# Patient Record
Sex: FEMALE | Race: WHITE | NOT HISPANIC OR LATINO | Employment: FULL TIME | RURAL
[De-identification: names, ages, dates, MRNs, and addresses within clinical notes are randomized per-mention and may not be internally consistent; named-entity substitution may affect disease eponyms.]

---

## 2020-06-25 ENCOUNTER — HISTORICAL (OUTPATIENT)
Dept: ADMINISTRATIVE | Facility: HOSPITAL | Age: 45
End: 2020-06-25

## 2020-06-25 LAB — HCG SERUM QUALITATIVE: NEGATIVE

## 2021-07-22 ENCOUNTER — HOSPITAL ENCOUNTER (OUTPATIENT)
Dept: RADIOLOGY | Facility: HOSPITAL | Age: 46
Discharge: HOME | End: 2021-07-22
Attending: ORTHOPAEDIC SURGERY
Payer: OTHER GOVERNMENT

## 2021-07-22 DIAGNOSIS — M25.562 ACUTE PAIN OF LEFT KNEE: ICD-10-CM

## 2021-07-22 PROBLEM — G89.29 CHRONIC PAIN OF LEFT KNEE: Status: ACTIVE | Noted: 2021-07-22

## 2021-07-22 PROCEDURE — 73562 X-RAY EXAM OF KNEE 3: CPT | Mod: TC,LT

## 2021-10-21 ENCOUNTER — HOSPITAL ENCOUNTER (OUTPATIENT)
Dept: RADIOLOGY | Facility: HOSPITAL | Age: 46
Discharge: HOME | End: 2021-10-21
Attending: ORTHOPAEDIC SURGERY
Payer: OTHER GOVERNMENT

## 2021-10-21 DIAGNOSIS — M25.561 ACUTE PAIN OF RIGHT KNEE: ICD-10-CM

## 2021-10-21 PROCEDURE — 73562 X-RAY EXAM OF KNEE 3: CPT | Mod: TC,LT

## 2023-01-05 ENCOUNTER — OFFICE VISIT (OUTPATIENT)
Dept: ORTHOPEDICS | Facility: CLINIC | Age: 48
End: 2023-01-05
Payer: OTHER GOVERNMENT

## 2023-01-05 ENCOUNTER — CLINICAL SUPPORT (OUTPATIENT)
Dept: CARDIOLOGY | Facility: CLINIC | Age: 48
End: 2023-01-05
Payer: OTHER GOVERNMENT

## 2023-01-05 DIAGNOSIS — Z01.810 PRE-OPERATIVE CARDIOVASCULAR EXAMINATION: ICD-10-CM

## 2023-01-05 DIAGNOSIS — M25.511 ACUTE PAIN OF RIGHT SHOULDER: Primary | ICD-10-CM

## 2023-01-05 DIAGNOSIS — Z01.812 PRE-OPERATIVE LABORATORY EXAMINATION: ICD-10-CM

## 2023-01-05 DIAGNOSIS — M25.531 RIGHT WRIST PAIN: ICD-10-CM

## 2023-01-05 PROCEDURE — 93010 EKG 12-LEAD: ICD-10-PCS | Mod: S$PBB,,, | Performed by: STUDENT IN AN ORGANIZED HEALTH CARE EDUCATION/TRAINING PROGRAM

## 2023-01-05 PROCEDURE — 99214 OFFICE O/P EST MOD 30 MIN: CPT | Mod: PBBFAC | Performed by: ORTHOPAEDIC SURGERY

## 2023-01-05 PROCEDURE — 99212 OFFICE O/P EST SF 10 MIN: CPT | Mod: PBBFAC

## 2023-01-05 PROCEDURE — 99203 OFFICE O/P NEW LOW 30 MIN: CPT | Mod: S$PBB,,, | Performed by: ORTHOPAEDIC SURGERY

## 2023-01-05 PROCEDURE — 93005 ELECTROCARDIOGRAM TRACING: CPT | Mod: PBBFAC | Performed by: STUDENT IN AN ORGANIZED HEALTH CARE EDUCATION/TRAINING PROGRAM

## 2023-01-05 PROCEDURE — 93010 ELECTROCARDIOGRAM REPORT: CPT | Mod: S$PBB,,, | Performed by: STUDENT IN AN ORGANIZED HEALTH CARE EDUCATION/TRAINING PROGRAM

## 2023-01-05 PROCEDURE — 99203 PR OFFICE/OUTPT VISIT, NEW, LEVL III, 30-44 MIN: ICD-10-PCS | Mod: S$PBB,,, | Performed by: ORTHOPAEDIC SURGERY

## 2023-01-05 NOTE — H&P (VIEW-ONLY)
CC:   Chief Complaint   Patient presents with    Right Shoulder - Pain        PREVIOUS INFO:   Previous Info:  07/22/2021HISTORY: Rosa Maria Keith  is a 45 y.o. status post  Right knee ACL 2013  Left knee ACL reconstruction 06/20/2012.  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     Patient is having left knee pain when she tries to do PT with the  she is requesting help with that        HISTORY:   1/5/2023    Rosa Maria Jaramillo  is a 47 y.o. she fell backwards look through both her hands out caught her self she had left and right shoulder pain also right wrist pain the shoulder has gotten better but the wrist is giving her troubles she reportedly had x-rays at the [place] [facility] this week which were negative at the wrist but it is bothering her she is had MRI of her right shoulder please see below it does show a rotator cuff tear she is been treated with a formal course of physical therapy is only making it worse this has been going on for a year now and she is ready to have something done      PAST MEDICAL HISTORY: History reviewed. No pertinent past medical history.       PAST SURGICAL HISTORY:   Past Surgical History:   Procedure Laterality Date    APPENDECTOMY  1994    KNEE ARTHROSCOPY W/ ACL RECONSTRUCTION Right 2013    ILM    KNEE ARTHROSCOPY W/ ACL RECONSTRUCTION AND PATELLA GRAFT Left 2012    ILM    KNEE ARTHROSCOPY W/ ACL RECONSTRUCTION AND PATELLA GRAFT Left 2015    ILM- ALLOGRAFT           ALLERGIES:   Review of patient's allergies indicates:   Allergen Reactions    Lisinopril      Other reaction(s): Cough    Penicillins Edema        MEDICATIONS :    Current Outpatient Medications:     amLODIPine (NORVASC) 10 MG tablet, , Disp: , Rfl:     dexAMETHasone (DECADRON) 4 MG Tab, TAKE 1/2 TABLET DAILY ON DAY 7-14 OF CYCLE, Disp: , Rfl:     letrozole (FEMARA) 2.5 mg Tab, TAKE 1 TABLET 2 TIMES A DAY ON DAY 3-7  OF CYCLE, Disp: , Rfl:     losartan (COZAAR) 25 MG tablet, , Disp: , Rfl:     meloxicam (MOBIC) 7.5 MG tablet, Take 1 tablet (7.5 mg total) by mouth once daily., Disp: 30 tablet, Rfl: 2    progesterone (PROMETRIUM) 200 MG capsule, TAKE TWO CAPSULES BY MOUTH DAILY AT BEDTIME ON DAYS 14-26 OF CYCLE, Disp: , Rfl:      SOCIAL HISTORY:   Social History     Socioeconomic History    Marital status: Significant Other   Tobacco Use    Smoking status: Never        ROS    FAMILY HISTORY: History reviewed. No pertinent family history.       PHYSICAL EXAM: There were no vitals filed for this visit.            There is no height or weight on file to calculate BMI.     In general, this is a well-developed, well-nourished female . The patient is alert, oriented and cooperative.      HEENT:  Normocephalic, atraumatic.  Extraocular movements are intact bilaterally.  The oropharynx is benign.       NECK:  Nontender with good range of motion.      PULMONARY: Respirations are even and non-labored.       CARDIOVASCULAR: Pulses regular by peripheral palpation.       ABDOMEN:  Soft, non-tender, non-distended.        EXTREMITIES:  Cervical spine she moves well does not cause her shoulder pain    Right shoulder sternoclavicular joints nontender AC joint is nontender  Subacromial space is tender to palpation she really not tender over the biceps pinch min testing 1 2 abduction all cause pain she has significantly decreased Timi's testing strength on the right shoulder    Ortho Exam      RADIOGRAPHIC FINDINGS:  MRI performed at [facility] dated 09/16/2022 right shoulder small focal full-thickness tear of the rotator cuff supraspinatus region I agree with these findings      .      IMPRESSION:  Right shoulder rotator cuff tear tear failed conservative therapy including including formal physical therapy she does desire proceed long rehab course discussed that with surgery    PLAN:  Right shoulder arthroscopy with open acromioplasty rotator cuff  repair will evaluate biceps time surgery long rehab course discussed    As far as her right wrist if you loaded and supinate or pronate she has significant pain she says is very difficult for her to tourniquet door handle will MRI her right wrist with intra-articular contrast              Ac Sharp III      (Subject to voice recognition error, transcription service not allowed)

## 2023-01-05 NOTE — PROGRESS NOTES
CC:   Chief Complaint   Patient presents with    Right Shoulder - Pain        PREVIOUS INFO:   Previous Info:  07/22/2021HISTORY: Rosa Maria Keith  is a 45 y.o. status post  Right knee ACL 2013  Left knee ACL reconstruction 06/20/2012.  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     Patient is having left knee pain when she tries to do PT with the  she is requesting help with that        HISTORY:   1/5/2023    Rosa Maria Jaramillo  is a 47 y.o. she fell backwards look through both her hands out caught her self she had left and right shoulder pain also right wrist pain the shoulder has gotten better but the wrist is giving her troubles she reportedly had x-rays at the [place] [facility] this week which were negative at the wrist but it is bothering her she is had MRI of her right shoulder please see below it does show a rotator cuff tear she is been treated with a formal course of physical therapy is only making it worse this has been going on for a year now and she is ready to have something done      PAST MEDICAL HISTORY: History reviewed. No pertinent past medical history.       PAST SURGICAL HISTORY:   Past Surgical History:   Procedure Laterality Date    APPENDECTOMY  1994    KNEE ARTHROSCOPY W/ ACL RECONSTRUCTION Right 2013    ILM    KNEE ARTHROSCOPY W/ ACL RECONSTRUCTION AND PATELLA GRAFT Left 2012    ILM    KNEE ARTHROSCOPY W/ ACL RECONSTRUCTION AND PATELLA GRAFT Left 2015    ILM- ALLOGRAFT           ALLERGIES:   Review of patient's allergies indicates:   Allergen Reactions    Lisinopril      Other reaction(s): Cough    Penicillins Edema        MEDICATIONS :    Current Outpatient Medications:     amLODIPine (NORVASC) 10 MG tablet, , Disp: , Rfl:     dexAMETHasone (DECADRON) 4 MG Tab, TAKE 1/2 TABLET DAILY ON DAY 7-14 OF CYCLE, Disp: , Rfl:     letrozole (FEMARA) 2.5 mg Tab, TAKE 1 TABLET 2 TIMES A DAY ON DAY 3-7  OF CYCLE, Disp: , Rfl:     losartan (COZAAR) 25 MG tablet, , Disp: , Rfl:     meloxicam (MOBIC) 7.5 MG tablet, Take 1 tablet (7.5 mg total) by mouth once daily., Disp: 30 tablet, Rfl: 2    progesterone (PROMETRIUM) 200 MG capsule, TAKE TWO CAPSULES BY MOUTH DAILY AT BEDTIME ON DAYS 14-26 OF CYCLE, Disp: , Rfl:      SOCIAL HISTORY:   Social History     Socioeconomic History    Marital status: Significant Other   Tobacco Use    Smoking status: Never        ROS    FAMILY HISTORY: History reviewed. No pertinent family history.       PHYSICAL EXAM: There were no vitals filed for this visit.            There is no height or weight on file to calculate BMI.     In general, this is a well-developed, well-nourished female . The patient is alert, oriented and cooperative.      HEENT:  Normocephalic, atraumatic.  Extraocular movements are intact bilaterally.  The oropharynx is benign.       NECK:  Nontender with good range of motion.      PULMONARY: Respirations are even and non-labored.       CARDIOVASCULAR: Pulses regular by peripheral palpation.       ABDOMEN:  Soft, non-tender, non-distended.        EXTREMITIES:  Cervical spine she moves well does not cause her shoulder pain    Right shoulder sternoclavicular joints nontender AC joint is nontender  Subacromial space is tender to palpation she really not tender over the biceps pinch min testing 1 2 abduction all cause pain she has significantly decreased Timi's testing strength on the right shoulder    Ortho Exam      RADIOGRAPHIC FINDINGS:  MRI performed at [facility] dated 09/16/2022 right shoulder small focal full-thickness tear of the rotator cuff supraspinatus region I agree with these findings      .      IMPRESSION:  Right shoulder rotator cuff tear tear failed conservative therapy including including formal physical therapy she does desire proceed long rehab course discussed that with surgery    PLAN:  Right shoulder arthroscopy with open acromioplasty rotator cuff  repair will evaluate biceps time surgery long rehab course discussed    As far as her right wrist if you loaded and supinate or pronate she has significant pain she says is very difficult for her to tourniquet door handle will MRI her right wrist with intra-articular contrast              Ac Sharp III      (Subject to voice recognition error, transcription service not allowed)

## 2023-02-01 ENCOUNTER — ANESTHESIA (OUTPATIENT)
Dept: SURGERY | Facility: HOSPITAL | Age: 48
End: 2023-02-01
Payer: OTHER GOVERNMENT

## 2023-02-01 ENCOUNTER — ANESTHESIA EVENT (OUTPATIENT)
Dept: SURGERY | Facility: HOSPITAL | Age: 48
End: 2023-02-01
Payer: OTHER GOVERNMENT

## 2023-02-01 ENCOUNTER — HOSPITAL ENCOUNTER (OUTPATIENT)
Facility: HOSPITAL | Age: 48
Discharge: HOME | End: 2023-02-01
Attending: ORTHOPAEDIC SURGERY | Admitting: ORTHOPAEDIC SURGERY
Payer: OTHER GOVERNMENT

## 2023-02-01 VITALS
RESPIRATION RATE: 16 BRPM | HEIGHT: 67 IN | SYSTOLIC BLOOD PRESSURE: 102 MMHG | TEMPERATURE: 98 F | DIASTOLIC BLOOD PRESSURE: 69 MMHG | OXYGEN SATURATION: 92 % | HEART RATE: 67 BPM | BODY MASS INDEX: 27.94 KG/M2 | WEIGHT: 178 LBS

## 2023-02-01 DIAGNOSIS — M75.100 ROTATOR CUFF TEAR: Primary | ICD-10-CM

## 2023-02-01 PROCEDURE — 25000003 PHARM REV CODE 250: Performed by: ANESTHESIOLOGY

## 2023-02-01 PROCEDURE — 27201423 OPTIME MED/SURG SUP & DEVICES STERILE SUPPLY: Performed by: ORTHOPAEDIC SURGERY

## 2023-02-01 PROCEDURE — 01630 ANES OPN/ARTHR PX SHO JT NOS: CPT | Performed by: ORTHOPAEDIC SURGERY

## 2023-02-01 PROCEDURE — 27000165 HC TUBE, ETT CUFFED: Performed by: ANESTHESIOLOGY

## 2023-02-01 PROCEDURE — 27000510 HC BLANKET BAIR HUGGER ANY SIZE: Performed by: ANESTHESIOLOGY

## 2023-02-01 PROCEDURE — D9220A PRA ANESTHESIA: Mod: CRNA,,, | Performed by: NURSE ANESTHETIST, CERTIFIED REGISTERED

## 2023-02-01 PROCEDURE — 71000016 HC POSTOP RECOV ADDL HR: Performed by: ORTHOPAEDIC SURGERY

## 2023-02-01 PROCEDURE — 64415 NJX AA&/STRD BRCH PLXS IMG: CPT | Mod: 59,RT,, | Performed by: ANESTHESIOLOGY

## 2023-02-01 PROCEDURE — 25000003 PHARM REV CODE 250: Performed by: ORTHOPAEDIC SURGERY

## 2023-02-01 PROCEDURE — C1889 IMPLANT/INSERT DEVICE, NOC: HCPCS | Performed by: ORTHOPAEDIC SURGERY

## 2023-02-01 PROCEDURE — 29806 PR SHLDR ARTHROSCOP,SURG,CAPSULORRHAPHY: ICD-10-PCS | Mod: RT,,, | Performed by: ORTHOPAEDIC SURGERY

## 2023-02-01 PROCEDURE — 27202344 HC EYESHIELD: Performed by: ANESTHESIOLOGY

## 2023-02-01 PROCEDURE — 37000008 HC ANESTHESIA 1ST 15 MINUTES: Performed by: ORTHOPAEDIC SURGERY

## 2023-02-01 PROCEDURE — 63600175 PHARM REV CODE 636 W HCPCS: Performed by: NURSE ANESTHETIST, CERTIFIED REGISTERED

## 2023-02-01 PROCEDURE — 64415 PERIPHERAL BLOCK: ICD-10-PCS | Mod: 59,RT,, | Performed by: ANESTHESIOLOGY

## 2023-02-01 PROCEDURE — 25000003 PHARM REV CODE 250: Performed by: NURSE ANESTHETIST, CERTIFIED REGISTERED

## 2023-02-01 PROCEDURE — 29806 SHO ARTHRS SRG CAPSULORRAPHY: CPT | Mod: RT,,, | Performed by: ORTHOPAEDIC SURGERY

## 2023-02-01 PROCEDURE — 23410 PR REPAIR ROTATOR CUFF,ACUTE: ICD-10-PCS | Mod: 51,RT,, | Performed by: ORTHOPAEDIC SURGERY

## 2023-02-01 PROCEDURE — 71000033 HC RECOVERY, INTIAL HOUR: Performed by: ORTHOPAEDIC SURGERY

## 2023-02-01 PROCEDURE — 36000710: Performed by: ORTHOPAEDIC SURGERY

## 2023-02-01 PROCEDURE — 27000689 HC BLADE LARYNGOSCOPE ANY SIZE: Performed by: ANESTHESIOLOGY

## 2023-02-01 PROCEDURE — 97161 PT EVAL LOW COMPLEX 20 MIN: CPT

## 2023-02-01 PROCEDURE — D9220A PRA ANESTHESIA: Mod: ANES,,, | Performed by: ANESTHESIOLOGY

## 2023-02-01 PROCEDURE — D9220A PRA ANESTHESIA: ICD-10-PCS | Mod: CRNA,,, | Performed by: NURSE ANESTHETIST, CERTIFIED REGISTERED

## 2023-02-01 PROCEDURE — 27000655: Performed by: ANESTHESIOLOGY

## 2023-02-01 PROCEDURE — D9220A PRA ANESTHESIA: ICD-10-PCS | Mod: ANES,,, | Performed by: ANESTHESIOLOGY

## 2023-02-01 PROCEDURE — 27000716 HC OXISENSOR PROBE, ANY SIZE: Performed by: ANESTHESIOLOGY

## 2023-02-01 PROCEDURE — 27200750 HC INSULATED NEEDLE/ STIMUPLEX: Performed by: ANESTHESIOLOGY

## 2023-02-01 PROCEDURE — 36000711: Performed by: ORTHOPAEDIC SURGERY

## 2023-02-01 PROCEDURE — 71000015 HC POSTOP RECOV 1ST HR: Performed by: ORTHOPAEDIC SURGERY

## 2023-02-01 PROCEDURE — 37000009 HC ANESTHESIA EA ADD 15 MINS: Performed by: ORTHOPAEDIC SURGERY

## 2023-02-01 PROCEDURE — 23410 REPAIR ROTATOR CUFF ACUTE: CPT | Mod: 51,RT,, | Performed by: ORTHOPAEDIC SURGERY

## 2023-02-01 PROCEDURE — 63600175 PHARM REV CODE 636 W HCPCS: Performed by: ANESTHESIOLOGY

## 2023-02-01 PROCEDURE — C1713 ANCHOR/SCREW BN/BN,TIS/BN: HCPCS | Performed by: ORTHOPAEDIC SURGERY

## 2023-02-01 DEVICE — ANCHOR SUT PUSHLOCK 3.5X14MM: Type: IMPLANTABLE DEVICE | Site: SHOULDER | Status: FUNCTIONAL

## 2023-02-01 DEVICE — ANCHOR SUT FT BIOCRKSCR 5.5MM: Type: IMPLANTABLE DEVICE | Site: SHOULDER | Status: FUNCTIONAL

## 2023-02-01 DEVICE — IMPLANTABLE DEVICE: Type: IMPLANTABLE DEVICE | Site: SHOULDER | Status: FUNCTIONAL

## 2023-02-01 RX ORDER — HYDROCODONE BITARTRATE AND ACETAMINOPHEN 5; 325 MG/1; MG/1
1 TABLET ORAL EVERY 4 HOURS PRN
Status: DISCONTINUED | OUTPATIENT
Start: 2023-02-01 | End: 2023-02-01 | Stop reason: HOSPADM

## 2023-02-01 RX ORDER — EPHEDRINE SULFATE 50 MG/ML
INJECTION, SOLUTION INTRAVENOUS
Status: DISCONTINUED | OUTPATIENT
Start: 2023-02-01 | End: 2023-02-01

## 2023-02-01 RX ORDER — DIPHENHYDRAMINE HYDROCHLORIDE 50 MG/ML
25 INJECTION INTRAMUSCULAR; INTRAVENOUS EVERY 6 HOURS PRN
Status: DISCONTINUED | OUTPATIENT
Start: 2023-02-01 | End: 2023-02-01 | Stop reason: HOSPADM

## 2023-02-01 RX ORDER — ONDANSETRON 2 MG/ML
4 INJECTION INTRAMUSCULAR; INTRAVENOUS DAILY PRN
Status: DISCONTINUED | OUTPATIENT
Start: 2023-02-01 | End: 2023-02-01 | Stop reason: HOSPADM

## 2023-02-01 RX ORDER — FENTANYL CITRATE 50 UG/ML
INJECTION, SOLUTION INTRAMUSCULAR; INTRAVENOUS
Status: DISCONTINUED | OUTPATIENT
Start: 2023-02-01 | End: 2023-02-01

## 2023-02-01 RX ORDER — KETOROLAC TROMETHAMINE 10 MG/1
10 TABLET, FILM COATED ORAL EVERY 6 HOURS
Qty: 20 TABLET | Refills: 0 | Status: SHIPPED | OUTPATIENT
Start: 2023-02-01 | End: 2023-02-06

## 2023-02-01 RX ORDER — ONDANSETRON 2 MG/ML
INJECTION INTRAMUSCULAR; INTRAVENOUS
Status: DISCONTINUED | OUTPATIENT
Start: 2023-02-01 | End: 2023-02-01

## 2023-02-01 RX ORDER — ROPIVACAINE HYDROCHLORIDE 7.5 MG/ML
INJECTION, SOLUTION EPIDURAL; PERINEURAL
Status: COMPLETED | OUTPATIENT
Start: 2023-02-01 | End: 2023-02-01

## 2023-02-01 RX ORDER — SCOLOPAMINE TRANSDERMAL SYSTEM 1 MG/1
PATCH, EXTENDED RELEASE TRANSDERMAL
Status: DISCONTINUED | OUTPATIENT
Start: 2023-02-01 | End: 2023-02-01

## 2023-02-01 RX ORDER — SODIUM CHLORIDE, SODIUM LACTATE, POTASSIUM CHLORIDE, CALCIUM CHLORIDE 600; 310; 30; 20 MG/100ML; MG/100ML; MG/100ML; MG/100ML
125 INJECTION, SOLUTION INTRAVENOUS CONTINUOUS
Status: DISCONTINUED | OUTPATIENT
Start: 2023-02-01 | End: 2023-02-01 | Stop reason: HOSPADM

## 2023-02-01 RX ORDER — PHENYLEPHRINE HYDROCHLORIDE 10 MG/ML
INJECTION INTRAVENOUS
Status: DISCONTINUED | OUTPATIENT
Start: 2023-02-01 | End: 2023-02-01

## 2023-02-01 RX ORDER — DIMENHYDRINATE 50 MG
50 TABLET ORAL ONCE
Status: COMPLETED | OUTPATIENT
Start: 2023-02-01 | End: 2023-02-01

## 2023-02-01 RX ORDER — LABETALOL 100 MG/1
100 TABLET, FILM COATED ORAL
COMMUNITY
Start: 2022-12-28

## 2023-02-01 RX ORDER — OXYCODONE HYDROCHLORIDE 5 MG/1
5 TABLET ORAL
Status: DISCONTINUED | OUTPATIENT
Start: 2023-02-01 | End: 2023-02-01 | Stop reason: HOSPADM

## 2023-02-01 RX ORDER — MORPHINE SULFATE 10 MG/ML
4 INJECTION INTRAMUSCULAR; INTRAVENOUS; SUBCUTANEOUS EVERY 5 MIN PRN
Status: DISCONTINUED | OUTPATIENT
Start: 2023-02-01 | End: 2023-02-01 | Stop reason: HOSPADM

## 2023-02-01 RX ORDER — KETOROLAC TROMETHAMINE 30 MG/ML
INJECTION, SOLUTION INTRAMUSCULAR; INTRAVENOUS
Status: DISCONTINUED | OUTPATIENT
Start: 2023-02-01 | End: 2023-02-01

## 2023-02-01 RX ORDER — PROPOFOL 10 MG/ML
VIAL (ML) INTRAVENOUS
Status: DISCONTINUED | OUTPATIENT
Start: 2023-02-01 | End: 2023-02-01

## 2023-02-01 RX ORDER — ROCURONIUM BROMIDE 10 MG/ML
INJECTION, SOLUTION INTRAVENOUS
Status: DISCONTINUED | OUTPATIENT
Start: 2023-02-01 | End: 2023-02-01

## 2023-02-01 RX ORDER — MIDAZOLAM HYDROCHLORIDE 1 MG/ML
INJECTION INTRAMUSCULAR; INTRAVENOUS
Status: DISCONTINUED | OUTPATIENT
Start: 2023-02-01 | End: 2023-02-01

## 2023-02-01 RX ORDER — DIPHENHYDRAMINE HYDROCHLORIDE 50 MG/ML
INJECTION INTRAMUSCULAR; INTRAVENOUS
Status: DISCONTINUED | OUTPATIENT
Start: 2023-02-01 | End: 2023-02-01

## 2023-02-01 RX ORDER — ONDANSETRON 4 MG/1
8 TABLET, ORALLY DISINTEGRATING ORAL EVERY 8 HOURS PRN
Status: DISCONTINUED | OUTPATIENT
Start: 2023-02-01 | End: 2023-02-01 | Stop reason: HOSPADM

## 2023-02-01 RX ORDER — LIDOCAINE HYDROCHLORIDE 20 MG/ML
INJECTION, SOLUTION EPIDURAL; INFILTRATION; INTRACAUDAL; PERINEURAL
Status: DISCONTINUED | OUTPATIENT
Start: 2023-02-01 | End: 2023-02-01

## 2023-02-01 RX ORDER — SODIUM CHLORIDE 9 MG/ML
INJECTION, SOLUTION INTRAVENOUS CONTINUOUS
Status: DISCONTINUED | OUTPATIENT
Start: 2023-02-01 | End: 2023-02-01 | Stop reason: HOSPADM

## 2023-02-01 RX ORDER — HYDROMORPHONE HYDROCHLORIDE 2 MG/ML
0.5 INJECTION, SOLUTION INTRAMUSCULAR; INTRAVENOUS; SUBCUTANEOUS EVERY 5 MIN PRN
Status: DISCONTINUED | OUTPATIENT
Start: 2023-02-01 | End: 2023-02-01 | Stop reason: HOSPADM

## 2023-02-01 RX ORDER — CLINDAMYCIN PHOSPHATE 900 MG/50ML
INJECTION, SOLUTION INTRAVENOUS
Status: DISCONTINUED | OUTPATIENT
Start: 2023-02-01 | End: 2023-02-01

## 2023-02-01 RX ORDER — HYDROCODONE BITARTRATE AND ACETAMINOPHEN 7.5; 325 MG/1; MG/1
1 TABLET ORAL EVERY 6 HOURS PRN
Qty: 20 TABLET | Refills: 0 | Status: SHIPPED | OUTPATIENT
Start: 2023-02-01 | End: 2023-02-07 | Stop reason: ALTCHOICE

## 2023-02-01 RX ORDER — DEXAMETHASONE SODIUM PHOSPHATE 4 MG/ML
INJECTION, SOLUTION INTRA-ARTICULAR; INTRALESIONAL; INTRAMUSCULAR; INTRAVENOUS; SOFT TISSUE
Status: DISCONTINUED | OUTPATIENT
Start: 2023-02-01 | End: 2023-02-01

## 2023-02-01 RX ORDER — MEPERIDINE HYDROCHLORIDE 25 MG/ML
25 INJECTION INTRAMUSCULAR; INTRAVENOUS; SUBCUTANEOUS EVERY 10 MIN PRN
Status: DISCONTINUED | OUTPATIENT
Start: 2023-02-01 | End: 2023-02-01 | Stop reason: HOSPADM

## 2023-02-01 RX ADMIN — EPHEDRINE SULFATE 20 MG: 50 INJECTION INTRAVENOUS at 08:02

## 2023-02-01 RX ADMIN — PHENYLEPHRINE HYDROCHLORIDE 200 MCG: 10 INJECTION INTRAVENOUS at 09:02

## 2023-02-01 RX ADMIN — PHENYLEPHRINE HYDROCHLORIDE 100 MCG: 10 INJECTION INTRAVENOUS at 08:02

## 2023-02-01 RX ADMIN — FENTANYL CITRATE 50 MCG: 50 INJECTION INTRAMUSCULAR; INTRAVENOUS at 07:02

## 2023-02-01 RX ADMIN — MIDAZOLAM 2 MG: 1 INJECTION INTRAMUSCULAR; INTRAVENOUS at 07:02

## 2023-02-01 RX ADMIN — SCOPALAMINE 1 PATCH: 1 PATCH, EXTENDED RELEASE TRANSDERMAL at 07:02

## 2023-02-01 RX ADMIN — EPHEDRINE SULFATE 10 MG: 50 INJECTION INTRAVENOUS at 08:02

## 2023-02-01 RX ADMIN — PHENYLEPHRINE HYDROCHLORIDE 200 MCG: 10 INJECTION INTRAVENOUS at 08:02

## 2023-02-01 RX ADMIN — DEXAMETHASONE SODIUM PHOSPHATE 8 MG: 4 INJECTION, SOLUTION INTRA-ARTICULAR; INTRALESIONAL; INTRAMUSCULAR; INTRAVENOUS; SOFT TISSUE at 07:02

## 2023-02-01 RX ADMIN — DIMENHYDRINATE 50 MG: 50 TABLET ORAL at 07:02

## 2023-02-01 RX ADMIN — SODIUM CHLORIDE: 9 INJECTION, SOLUTION INTRAVENOUS at 06:02

## 2023-02-01 RX ADMIN — PHENYLEPHRINE HYDROCHLORIDE 200 MCG: 10 INJECTION INTRAVENOUS at 07:02

## 2023-02-01 RX ADMIN — ROCURONIUM BROMIDE 40 MG: 10 INJECTION, SOLUTION INTRAVENOUS at 07:02

## 2023-02-01 RX ADMIN — PROPOFOL 120 MG: 10 INJECTION, EMULSION INTRAVENOUS at 07:02

## 2023-02-01 RX ADMIN — KETOROLAC TROMETHAMINE 60 MG: 30 INJECTION, SOLUTION INTRAMUSCULAR at 07:02

## 2023-02-01 RX ADMIN — ROPIVACAINE HYDROCHLORIDE 30 ML: 7.5 INJECTION, SOLUTION EPIDURAL; PERINEURAL at 07:02

## 2023-02-01 RX ADMIN — CLINDAMYCIN IN 5 PERCENT DEXTROSE 900 MG: 18 INJECTION, SOLUTION INTRAVENOUS at 07:02

## 2023-02-01 RX ADMIN — SODIUM CHLORIDE: 9 INJECTION, SOLUTION INTRAVENOUS at 07:02

## 2023-02-01 RX ADMIN — PROPOFOL 50 MG: 10 INJECTION, EMULSION INTRAVENOUS at 07:02

## 2023-02-01 RX ADMIN — SUGAMMADEX 200 MG: 100 INJECTION, SOLUTION INTRAVENOUS at 09:02

## 2023-02-01 RX ADMIN — ONDANSETRON 4 MG: 2 INJECTION INTRAMUSCULAR; INTRAVENOUS at 09:02

## 2023-02-01 RX ADMIN — LIDOCAINE HYDROCHLORIDE 40 MG: 20 INJECTION, SOLUTION EPIDURAL; INFILTRATION; INTRACAUDAL; PERINEURAL at 07:02

## 2023-02-01 RX ADMIN — PROPOFOL 30 MG: 10 INJECTION, EMULSION INTRAVENOUS at 07:02

## 2023-02-01 RX ADMIN — FENTANYL CITRATE 50 MCG: 50 INJECTION INTRAMUSCULAR; INTRAVENOUS at 09:02

## 2023-02-01 RX ADMIN — DIPHENHYDRAMINE HYDROCHLORIDE 12.5 MG: 50 INJECTION, SOLUTION INTRAMUSCULAR; INTRAVENOUS at 08:02

## 2023-02-01 RX ADMIN — ONDANSETRON 4 MG: 2 INJECTION INTRAMUSCULAR; INTRAVENOUS at 07:02

## 2023-02-01 NOTE — DISCHARGE INSTRUCTIONS
Use immobilizer at all times except for showers and exercises. Continue white hose until followup appointment. May remove hose twice daily for one hour. Remove brown dressing on Saturday then cover with clear dressings then may shower.

## 2023-02-01 NOTE — ANESTHESIA POSTPROCEDURE EVALUATION
Anesthesia Post Evaluation    Patient: Rosa Maria Jaramillo    Procedure(s) Performed: Procedure(s) (LRB):  ARTHROSCOPY, SHOULDER (Right)  REPAIR, ROTATOR CUFF (Right)  ACROMIOPLASTY (Right)  REPAIR, ANTERIOR LABRUM,SHOULDER (Right)    Final Anesthesia Type: general      Patient location during evaluation: PACU  Patient participation: Yes- Able to Participate  Level of consciousness: awake and sedated  Post-procedure vital signs: reviewed and stable  Pain management: adequate  Airway patency: patent    PONV status at discharge: No PONV  Anesthetic complications: no      Cardiovascular status: blood pressure returned to baseline  Respiratory status: unassisted  Hydration status: euvolemic  Follow-up not needed.          Vitals Value Taken Time   /69 02/01/23 1131   Temp 36.5 °C (97.7 °F) 02/01/23 1007   Pulse 68 02/01/23 1137   Resp 16 02/01/23 1115   SpO2 91 % 02/01/23 1137   Vitals shown include unvalidated device data.      Event Time   Out of Recovery 10:33:44         Pain/Raza Score: Raza Score: 8 (2/1/2023 10:40 AM)

## 2023-02-01 NOTE — PT/OT/SLP EVAL
Physical Therapy Evaluation    Patient Name:  Rosa Maria Jaramillo   MRN:  [id number]    Recommendations:     Discharge Recommendations: outpatient PT   Discharge Equipment Recommendations: none   Barriers to discharge: None    Assessment:     Rosa Maria Jaramillo is a 47 y.o. female admitted with a medical diagnosis of Rotator cuff tear.  She presents with the following impairments/functional limitations: decreased ROM Patient with good understanding of post op education. Will follow up with OP PT. .    Rehab Prognosis: Good; patient would benefit from acute skilled PT services to address these deficits and reach maximum level of function.    Recent Surgery: Procedure(s) (LRB):  ARTHROSCOPY, SHOULDER (Right)  REPAIR, ROTATOR CUFF (Right)  ACROMIOPLASTY (Right)  REPAIR, ANTERIOR LABRUM,SHOULDER (Right) Day of Surgery    Plan:     During this hospitalization, patient to be seen 1 x/week to address the identified rehab impairments via therapeutic activities and progress toward the following goals:    Plan of Care Expires:  02/01/23    Subjective     Chief Complaint: post op block  Patient/Family Comments/goals: Plan is dc home  Pain/Comfort:  Pain Rating 1: 0/10    Patients cultural, spiritual, Restorationism conflicts given the current situation: no    Living Environment:  Lives with family  Prior to admission, patients level of function was independent.  Equipment used at home: none.  DME owned (not currently used): none.  Upon discharge, patient will have assistance from family.    Objective:     Communicated with nurse prior to session.  Patient found supine with    upon PT entry to room.    General Precautions: Standard, fall  Orthopedic Precautions:    Braces: Shoulder abduction brace  Respiratory Status: Room air    Exams:  na    Functional Mobility:  Bed Mobility:     Rolling Left:  supervision      AM-PAC 6 CLICK MOBILITY  Total Score:18       Treatment & Education:  HEP: don/doff abd pillow, hep prom    Patient left  supine with call button in reach.    GOALS:   Multidisciplinary Problems       Physical Therapy Goals       Not on file              Multidisciplinary Problems (Resolved)          Problem: Physical Therapy    Goal Priority Disciplines Outcome Goal Variances Interventions   Physical Therapy Goal   (Resolved)     PT, PT/OT Met     Description: Short Term Goals  Independent with don/doff abd pillow  Independent with HEP    Long Term Goals  Independent with upper extremity dressing/ADL's                           History:     Past Medical History:   Diagnosis Date    Postoperative nausea        Past Surgical History:   Procedure Laterality Date    APPENDECTOMY  1994    KNEE ARTHROSCOPY W/ ACL RECONSTRUCTION Right 2013    ILM    KNEE ARTHROSCOPY W/ ACL RECONSTRUCTION AND PATELLA GRAFT Left 2012    ILM    KNEE ARTHROSCOPY W/ ACL RECONSTRUCTION AND PATELLA GRAFT Left 2015    ILM- ALLOGRAFT        Time Tracking:     PT Received On: 02/01/23  PT Start Time: 1130     PT Stop Time: 1150  PT Total Time (min): 20 min     Billable Minutes: Evaluation 20 02/01/2023

## 2023-02-01 NOTE — ANESTHESIA PROCEDURE NOTES
Peripheral Block    Patient location during procedure: OR   Block not for primary anesthetic.  Reason for block: at surgeon's request and post-op pain management   Post-op Pain Location: rt shoulder scope   Start time: 2/1/2023 7:37 AM  Timeout: 2/1/2023 7:36 AM   End time: 2/1/2023 7:42 AM    Staffing  Authorizing Provider: Owen Hannon MD  Performing Provider: Owen Hannon MD    Preanesthetic Checklist  Completed: patient identified, IV checked, site marked, risks and benefits discussed, surgical consent, monitors and equipment checked, pre-op evaluation and timeout performed  Peripheral Block  Patient position: sitting  Prep: ChloraPrep  Patient monitoring: heart rate, continuous pulse ox, continuous capnometry, frequent blood pressure checks and cardiac monitor  Block type: interscalene  Laterality: right  Injection technique: single shot  Needle  Needle type: Stimuplex   Needle gauge: 20 G  Needle length: 2 in  Needle localization: nerve stimulator and ultrasound guidance   -ultrasound image captured on disc.  Assessment  Injection assessment: negative aspiration, negative parasthesia and local visualized surrounding nerve  Paresthesia pain: none  Heart rate change: no  Slow fractionated injection: yes  Pain Tolerance: comfortable throughout block  Medications:    Medications: ROPIvacaine (NAROPIN) injection 0.75% - Perineural   30 mL - 2/1/2023 7:36:00 AM

## 2023-02-01 NOTE — BRIEF OP NOTE
[facility] - Orthopedic Periop Services  Brief Operative Note       Surgery Date: 2/1/2023     PREOPERATIVE DIAGNOSIS:  Right shoulder rotator cuff tear complete    POSTOPERATIVE DIAGNOSIS:  Right shoulder rotator cuff tear complete  Anterior labrum tear      PROCEDURE:  Right shoulder arthroscopy with anterior labrum repair open acromioplasty and cuff repair  One PushLock on the labrum  1 bio corkscrew 1 push lock on the cuff    IMPANTS USED:   Implant Name Type Inv. Item Serial No.  Lot No. LRB No. Used Action   ANCHOR SUT PEEK PUSHLOCK 2.9MM - [id number]  ANCHOR SUT PEEK PUSHLOCK 2.9MM  [employer] [id number] Right 1 Implanted   ANCHOR SUT FT BIOCRKSCR 5.5MM - [id number]  ANCHOR SUT FT BIOCRKSCR 5.5MM  [employer] [id number] Right 1 Implanted   ANCHOR SUT PUSHLOCK 3.5X14MM - [id number]  ANCHOR SUT PUSHLOCK 3.5X14MM  [employer] [id number] Right 1 Implanted   ANCHOR SUT PUSHLOCK 3.5X14MM - [id number]  ANCHOR SUT PUSHLOCK 3.5X14MM  [employer] [id number] Right 1 Implanted       SURGEON: Dr. Sharp     ASSISTANT:  Ramses    ANESTHESIA:  General interscalene block    ESTIMATED BLOOD LOSS:  20 cc    COMPLICATIONS:  None    Specimens:   Specimen (24h ago, onward)      None              Discharge Note    OUTCOME: Patient tolerated treatment/procedure well without complication and is now ready for discharge.    DISPOSITION: Home or Self Care    FINAL DIAGNOSIS:  Rotator cuff tear    FOLLOWUP: In clinic    DISCHARGE INSTRUCTIONS:    Discharge Procedure Orders   Diet general     Call MD for:  temperature >100.4     Call MD for:  redness, tenderness, or signs of infection (pain, swelling, redness, odor or green/yellow discharge around incision site)     Remove dressing in 72 hours   Order Comments: Applied site Op site         Ac Sharp III

## 2023-02-01 NOTE — ANESTHESIA PROCEDURE NOTES
Intubation    Date/Time: 2/1/2023 7:46 AM  Performed by: Yokasta Razo CRNA  Authorized by: Owen Hannon MD     Intubation:     Induction:  Intravenous    Intubated:  Postinduction    Mask Ventilation:  Easy mask    Attempts:  1    Attempted By:  CRNA    Method of Intubation:  Direct    Blade:  Jaime 3    Laryngeal View Grade: Grade IIA - cords partially seen      Difficult Airway Encountered?: No      Complications:  None    Airway Device:  Oral endotracheal tube    Airway Device Size:  7.0    Style/Cuff Inflation:  Cuffed (inflated to minimal occlusive pressure)    Inflation Amount (mL):  7    Tube secured:  21    Placement Verified By:  Capnometry    Complicating Factors:  None    Findings Post-Intubation:  BS equal bilateral and atraumatic/condition of teeth unchanged

## 2023-02-01 NOTE — TRANSFER OF CARE
"Anesthesia Transfer of Care Note    Patient: Rosa Maria Jaramillo    Procedure(s) Performed: Procedure(s) (LRB):  ARTHROSCOPY, SHOULDER (Right)  REPAIR, ROTATOR CUFF (Right)  ACROMIOPLASTY (Right)  REPAIR, ANTERIOR LABRUM,SHOULDER (Right)    Patient location: PACU    Anesthesia Type: general and regional    Transport from OR: Transported from OR on 6-10 L/min O2 by face mask with adequate spontaneous ventilation    Post pain: adequate analgesia    Post assessment: no apparent anesthetic complications    Post vital signs: stable    Level of consciousness: awake and alert    Nausea/Vomiting: no nausea/vomiting    Complications: none    Transfer of care protocol was followed      Last vitals:   Visit Vitals  BP (!) 95/51   Pulse 65   Temp 36.5 °C (97.7 °F)   Resp 14   Ht 5' 7" (1.702 m)   Wt 80.7 kg (178 lb)   SpO2 (!) 91%   Breastfeeding No   BMI 27.88 kg/m²     "

## 2023-02-01 NOTE — OP NOTE
Department of Orthopedic Surgery    Operative Note      Surgery Date: 2/1/2023     PREOPERATIVE DIAGNOSIS:  Right shoulder rotator cuff tear complete    POSTOPERATIVE DIAGNOSIS:  Right shoulder rotator cuff tear complete  Anterior labrum tear      PROCEDURE:  Right shoulder arthroscopy with anterior labrum repair open acromioplasty and cuff repair  One PushLock on the labrum  1 bio corkscrew 1 push lock on the cuff    IMPANTS USED:   Implant Name Type Inv. Item Serial No.  Lot No. LRB No. Used Action   ANCHOR SUT PEEK PUSHLOCK 2.9MM - [id number]  ANCHOR SUT PEEK PUSHLOCK 2.9MM  [employer] [id number] Right 1 Implanted   ANCHOR SUT FT BIOCRKSCR 5.5MM - [id number]  ANCHOR SUT FT BIOCRKSCR 5.5MM  [employer] [id number] Right 1 Implanted   ANCHOR SUT PUSHLOCK 3.5X14MM - [id number]  ANCHOR SUT PUSHLOCK 3.5X14MM  [employer] [id number] Right 1 Implanted   ANCHOR SUT PUSHLOCK 3.5X14MM - [id number]  ANCHOR SUT PUSHLOCK 3.5X14MM  [employer] [id number] Right 1 Implanted       SURGEON: Dr. Sharp     ASSISTANT:  Ramses    ANESTHESIA:  General interscalene block    ESTIMATED BLOOD LOSS:  20 cc    COMPLICATIONS:  None    INDICATION:   Rosa Maria Jaramillo is a 47 y.o. patient was having right shoulder pain MRI show complete cuff tear she would failed conservative therapies including formal physical therapy she desire proceed with surgery long rehab course discussed    PROCEDURE: The patient was brought to the operating room.  General anesthesia and interscalene block were administered per Anesthesia.  The patient was positioned in a beach chair position.  All bony prominences were well padded.  The right arm and shoulder were prepped and draped in normal sterile fashion.  A spinal needle was introduced posteriorly and the shoulder was injected with Saline with good return.  The skin was cut.  Trocar for the camera was introduced.  A second portal was made anteriorly using a spinal needle going lateral to the coracoid above the subscapularis.   Disposable trocar was then introduced.  The shoulder was scoped through all compartments.  There was tearing of the anterior labrum at 3:00 a.m. position side put her repair in that in plate the large using switching stick went to the larger cannulas passed using a 90 degree Passer passed the suture anterior labrum after freeing this with a periosteal elevator and then used this to pass the FiberWire or labrum tape actually we did this in a double horizontal mattress fashion then drilled just on the anterior lip slightly superiorly and then used 2 PushLock to repair this down repaired nicely was stable on probing    The biceps insertion was intact the biceps exiting was in good condition the was a cuff tear lot of a partial tear then a complete tear in the center this this was debrided.  The glenoid articular cartilage looked good the subscap was in good condition the camera was removed oblique incision made of the tip of the acromion dissection was carried down the fascia skin was undermined circumferentially the deltoid was then open laterally elevated off the anterior acromion in line with the anterior clavicle osteotome saw was used to resect the anterior acromion level with the anterior clavicle osteotome was then used to remove the undersurface this was rasped smooth large redundant thick bursa was excised there was obvious cuff tear this was knife was used to sharply to get good edges good viable tissue tapped for a bio corkscrew was placed in the bio corkscrew was placed both sets of sutures were then used to repair this you shaped tear back down and then 1 additional free suture was placed proximal to that the sutures 1 1 strand from each of the lot of anchors was then taken to the lateral cortices double row repair using a single PushLock cuff was stable the range of motion Emily was copiously irrigated out again        Two bony tunnels were made in the acromion repairing the deltoid back to the acromion  and to itself with 2. Tycron.  2-0 Vicryl and 4-0 Prolene were used on the skin.  A sterile dressing and sling were applied.  The patient tolerated the procedure well without complications.                  Ac Sharp III, MD

## 2023-02-01 NOTE — ANESTHESIA PREPROCEDURE EVALUATION
02/01/2023  Rosa Maria Jaramillo is a 47 y.o., female.      Pre-op Assessment    I have reviewed the Patient Summary Reports.     I have reviewed the Nursing Notes. I have reviewed the NPO Status.   I have reviewed the Medications.     Review of Systems  Anesthesia Hx:  No problems with previous Anesthesia    Social:  Non-Smoker, No Alcohol Use    Hematology/Oncology:  Hematology Normal   Oncology Normal     EENT/Dental:EENT/Dental Normal   Cardiovascular:   Hypertension    Pulmonary:  Pulmonary Normal    Renal/:  Renal/ Normal     Hepatic/GI:  Hepatic/GI Normal    Musculoskeletal:  Musculoskeletal Normal    Neurological:  Neurology Normal    Endocrine:  Endocrine Normal    Dermatological:  Skin Normal    Psych:  Psychiatric Normal           Physical Exam  General: Well nourished    Airway:  Mallampati: II / II  Mouth Opening: Normal  TM Distance: > 6 cm  Tongue: Normal  Neck ROM: Normal ROM    Chest/Lungs:  Clear to auscultation, Normal Respiratory Rate    Heart:  Rate: Normal  Rhythm: Regular Rhythm        Anesthesia Plan  Type of Anesthesia, risks & benefits discussed:    Anesthesia Type: Gen ETT, Regional  Intra-op Monitoring Plan: Standard ASA Monitors  Post Op Pain Control Plan: multimodal analgesia  Induction:  IV  Informed Consent: Informed consent signed with the Patient and all parties understand the risks and agree with anesthesia plan.  All questions answered.   ASA Score: 2  Day of Surgery Review of History & Physical: H&P Update referred to the surgeon/provider.I have interviewed and examined the patient. I have reviewed the patient's H&P dated: There are no significant changes. H&P completed by Anesthesiologist.    Ready For Surgery From Anesthesia Perspective.     .

## 2023-02-16 ENCOUNTER — OFFICE VISIT (OUTPATIENT)
Dept: ORTHOPEDICS | Facility: CLINIC | Age: 48
End: 2023-02-16
Payer: OTHER GOVERNMENT

## 2023-02-16 DIAGNOSIS — M25.531 RIGHT WRIST PAIN: ICD-10-CM

## 2023-02-16 DIAGNOSIS — Z09 FOLLOW-UP EXAMINATION, FOLLOWING OTHER SURGERY: Primary | ICD-10-CM

## 2023-02-16 PROCEDURE — 99024 POSTOP FOLLOW-UP VISIT: CPT | Mod: ,,, | Performed by: ORTHOPAEDIC SURGERY

## 2023-02-16 PROCEDURE — 99024 PR POST-OP FOLLOW-UP VISIT: ICD-10-PCS | Mod: ,,, | Performed by: ORTHOPAEDIC SURGERY

## 2023-02-16 PROCEDURE — 99213 OFFICE O/P EST LOW 20 MIN: CPT | Mod: PBBFAC | Performed by: ORTHOPAEDIC SURGERY

## 2023-02-16 NOTE — PROGRESS NOTES
CC:   Chief Complaint   Patient presents with    Follow-up     RT SHOULDER SCOPE, OPEN ACROMIO, RCR, LABRUM REP 2/1 (15 DAYS)        PREVIOUS INFO:       Surgery Date: 2/1/2023      PREOPERATIVE DIAGNOSIS:  Right shoulder rotator cuff tear complete     POSTOPERATIVE DIAGNOSIS:  Right shoulder rotator cuff tear complete  Anterior labrum tear        PROCEDURE:  Right shoulder arthroscopy with anterior labrum repair open acromioplasty and cuff repair  One PushLock on the labrum  1 bio corkscrew 1 push lock on the cuff         HISTORY:   2/16/2023    Rosa Maria Jaramillo  is a 47 y.o. 2 weeks out right shoulder anterior labrum repair rotator cuff repair 1 bio corkscrew 2 push locks surgery was on February 1st 15 days ago      PAST MEDICAL HISTORY:   Past Medical History:   Diagnosis Date    Postoperative nausea           PAST SURGICAL HISTORY:   Past Surgical History:   Procedure Laterality Date    ACROMIOPLASTY Right 2/1/2023    Procedure: ACROMIOPLASTY;  Surgeon: Ac Sharp III, MD;  Location: [facility] OR;  Service: Orthopedics;  Laterality: Right;    APPENDECTOMY  1994    KNEE ARTHROSCOPY W/ ACL RECONSTRUCTION Right 2013    [facility]    KNEE ARTHROSCOPY W/ ACL RECONSTRUCTION AND PATELLA GRAFT Left 2012    ILM    KNEE ARTHROSCOPY W/ ACL RECONSTRUCTION AND PATELLA GRAFT Left 2015    [facility]- ALLOGRAFT     REPAIR OF LABRUM OF HIP Right 2/1/2023    Procedure: REPAIR, ANTERIOR LABRUM,SHOULDER;  Surgeon: Ac Sharp III, MD;  Location: [facility] OR;  Service: Orthopedics;  Laterality: Right;    ROTATOR CUFF REPAIR Right 2/1/2023    Procedure: REPAIR, ROTATOR CUFF;  Surgeon: Ac Sharp III, MD;  Location: [facility] OR;  Service: Orthopedics;  Laterality: Right;    SHOULDER ARTHROSCOPY Right 2/1/2023    Procedure: ARTHROSCOPY, SHOULDER;  Surgeon: Ac Sharp III, MD;  Location: [facility] OR;  Service: Orthopedics;  Laterality: Right;          ALLERGIES:   Review of patient's allergies  indicates:   Allergen Reactions    Lisinopril      Other reaction(s): Cough    Penicillins Edema        MEDICATIONS :    Current Outpatient Medications:     amLODIPine (NORVASC) 10 MG tablet, , Disp: , Rfl:     dexAMETHasone (DECADRON) 4 MG Tab, TAKE 1/2 TABLET DAILY ON DAY 7-14 OF CYCLE, Disp: , Rfl:     HYDROcodone-acetaminophen (NORCO) 5-325 mg per tablet, Take 1 tablet by mouth every 6 (six) hours as needed for Pain., Disp: 20 tablet, Rfl: 0    labetaloL (NORMODYNE) 100 MG tablet, Take 100 mg by mouth., Disp: , Rfl:     letrozole (FEMARA) 2.5 mg Tab, TAKE 1 TABLET 2 TIMES A DAY ON DAY 3-7 OF CYCLE, Disp: , Rfl:     losartan (COZAAR) 25 MG tablet, , Disp: , Rfl:     progesterone (PROMETRIUM) 200 MG capsule, TAKE TWO CAPSULES BY MOUTH DAILY AT BEDTIME ON DAYS 14-26 OF CYCLE, Disp: , Rfl:      SOCIAL HISTORY:   Social History     Socioeconomic History    Marital status: Significant Other   Tobacco Use    Smoking status: Never        ROS    FAMILY HISTORY: No family history on file.       PHYSICAL EXAM: There were no vitals filed for this visit.            There is no height or weight on file to calculate BMI.     In general, this is a well-developed, well-nourished female . The patient is alert, oriented and cooperative.      HEENT:  Normocephalic, atraumatic.  Extraocular movements are intact bilaterally.  The oropharynx is benign.       NECK:  Nontender with good range of motion.      PULMONARY: Respirations are even and non-labored.       CARDIOVASCULAR: Pulses regular by peripheral palpation.       ABDOMEN:  Soft, non-tender, non-distended.        EXTREMITIES:  Motion is little limping she has 80° forward flexion 70° abduction so we little behind here    Ortho Exam      RADIOGRAPHIC FINDINGS:      .      IMPRESSION:  Status post rotator cuff repair little behind    PLAN:  We will get her continue therapy get a home pulley stopping the shoulder immobilizer totally        As far as her right wrist if you loaded  and supinate or pronate she has significant pain she says is very difficult for her to tourniquet door handle will MRI her right wrist with intra-articular contrast  She is still having wrist symptoms thinks she is gotten a referral now so will proceed with a MRI of the wrist where about TFC type injury she is having ulnar pain        No follow-ups on file.         Ac Sharp III      (Subject to voice recognition error, transcription service not allowed)

## 2023-03-15 ENCOUNTER — TELEPHONE (OUTPATIENT)
Dept: ORTHOPEDICS | Facility: CLINIC | Age: 48
End: 2023-03-15
Payer: OTHER GOVERNMENT

## 2023-03-16 ENCOUNTER — OFFICE VISIT (OUTPATIENT)
Dept: ORTHOPEDICS | Facility: CLINIC | Age: 48
End: 2023-03-16
Payer: OTHER GOVERNMENT

## 2023-03-16 DIAGNOSIS — Z09 FOLLOW-UP EXAMINATION, FOLLOWING OTHER SURGERY: Primary | ICD-10-CM

## 2023-03-16 PROCEDURE — 99212 OFFICE O/P EST SF 10 MIN: CPT | Mod: PBBFAC | Performed by: ORTHOPAEDIC SURGERY

## 2023-03-16 PROCEDURE — 99024 PR POST-OP FOLLOW-UP VISIT: ICD-10-PCS | Mod: ,,, | Performed by: ORTHOPAEDIC SURGERY

## 2023-03-16 PROCEDURE — 99024 POSTOP FOLLOW-UP VISIT: CPT | Mod: ,,, | Performed by: ORTHOPAEDIC SURGERY

## 2023-03-16 NOTE — PROGRESS NOTES
CC:    Chief Complaint   Patient presents with    Follow-up     RT SHOULDER SCOPE, OPEN ACROMIO, RCR, LABRUM REP 2/1 (6WKS)           Previos History :       Surgery Date: 2/1/2023      PREOPERATIVE DIAGNOSIS:  Right shoulder rotator cuff tear complete     POSTOPERATIVE DIAGNOSIS:  Right shoulder rotator cuff tear complete  Anterior labrum tear        PROCEDURE:  Right shoulder arthroscopy with anterior labrum repair open acromioplasty and cuff repair  One PushLock on the labrum  1 bio corkscrew 1 push lock on the cuff           HISTORY:   2/16/2023    Rosa Maria Jaramillo  is a 47 y.o. 2 weeks out right shoulder anterior labrum repair rotator cuff repair 1 bio corkscrew 2 push locks surgery was on February 1st 15 days ago         History:  3/16/2023   Rosa Maria Jaramillo is a 47 y.o.  status post 6 weeks out right shoulder rotator cuff repair near come near complete tear that was opened up repair 1 bio corkscrew 2 push locks plasty right shoulder  date of surgery 02/01/2023 6 weeks ago      PE:   A little over 90° forward flexion 80° abduction      Radiology:          Ass/Plan:  Continue motion she is going out to [employer] will check on her in about a month        Ac Sharp III, MD    Subject to voice recognition errors,  transcription services are not allowed

## 2023-04-11 ENCOUNTER — OFFICE VISIT (OUTPATIENT)
Dept: ORTHOPEDICS | Facility: CLINIC | Age: 48
End: 2023-04-11
Payer: OTHER GOVERNMENT

## 2023-04-11 DIAGNOSIS — Z09 FOLLOW-UP EXAMINATION, FOLLOWING OTHER SURGERY: Primary | ICD-10-CM

## 2023-04-11 PROCEDURE — 99024 POSTOP FOLLOW-UP VISIT: CPT | Mod: ,,, | Performed by: ORTHOPAEDIC SURGERY

## 2023-04-11 PROCEDURE — 99024 PR POST-OP FOLLOW-UP VISIT: ICD-10-PCS | Mod: ,,, | Performed by: ORTHOPAEDIC SURGERY

## 2023-04-11 PROCEDURE — 99212 OFFICE O/P EST SF 10 MIN: CPT | Mod: PBBFAC | Performed by: ORTHOPAEDIC SURGERY

## 2023-04-11 NOTE — LETTER
April 11, 2023      [facility]  [address] [place] [address]  Phone: [phone]  Fax: [phone]       Patient: Rosa Maria Jaramillo   YOB: 1975  Date of Visit: 04/11/2023    To Whom It May Concern:    Daryn Jaramillo  was at [facility] on 04/11/2023. The patient is currently unable to perform in P.T.  If you have any questions or concerns, or if I can be of further assistance, please do not hesitate to contact me.    Sincerely,    Shelly Sharp III, M.D.

## 2023-04-11 NOTE — LETTER
April 11, 2023      [facility]  [address] [place] [address]  Phone: [phone]  Fax: [phone]       Patient: Rosa Maria Jaramillo   YOB: 1975  Date of Visit: 04/11/2023    To Whom It May Concern:    Daryn Jaramillo  was at [facility] on 04/11/2023. The patient is anable to perform P.T for a year. If you have any questions or concerns, or if I can be of further assistance, please do not hesitate to contact me.    Sincerely,    Shelly Sharp III, M.D.

## 2023-04-11 NOTE — PROGRESS NOTES
CC:    Chief Complaint   Patient presents with    Follow-up     RT SHOULDER SCOPE, OPEN ACROMIO, RCR,, LABRUM REP 2/1 (10 WKS)           Previos History :POSTOPERATIVE DIAGNOSIS:  Right shoulder rotator cuff tear complete  Anterior labrum tear        PROCEDURE:  Right shoulder arthroscopy with anterior labrum repair open acromioplasty and cuff repair  One PushLock on the labrum  1 bio corkscrew 1 push lock on the cuff   History:  3/16/2023   Rosa Maria Jaramillo is a 47 y.o.  status post 6 weeks out right shoulder rotator cuff repair near come near complete tear that was opened up repair 1 bio corkscrew 2 push locks plasty right shoulder  date of surgery 02/01/2023 6 weeks ago              History:  4/11/2023   Rosa Maria Jaramillo is a 47 y.o.  status post 10 weeks surgery was 02/01/2023 out right shoulder anterior labrum repair open acromioplasty rotator cuff repair  patient comes in she is been out of physical therapy for a couple weeks trying doing her own and she is not doing as well as we would hoped she said she went to 15 physical therapy visits and try care is not approved anymore as of now      PE:   On exam forward flexion is about 130 abduction is about 90 internal rotation she can maybe get to her pants pocket she not very good on internal rotation      Radiology:          Ass/Plan:  Patient needs therapy right new prescription but her long-term result be affected will definitely be better if she goes more therapy my opinion        Ac Sharp III, MD    Subject to voice recognition errors,  transcription services are not allowed

## 2023-04-11 NOTE — LETTER
April 11, 2023      [facility]  [address] [place] [address]  Phone: [phone]  Fax: [phone]       Patient: Rosa Maria Jaramillo   YOB: 1975  Date of Visit: 04/11/2023    To Whom It May Concern:    Daryn Jaramillo  was at [facility] on 04/11/2023. The patient is status post right shoulder arthroscopy with rotator cuff repair. She needs to be held out of strenuous activities and P.T. for approximately one year.  If you have any questions or concerns, or if I can be of further assistance, please do not hesitate to contact me.    Sincerely,    Shelly Sharp III, M.D.

## 2023-04-17 DIAGNOSIS — Z09 FOLLOW-UP EXAMINATION, FOLLOWING OTHER SURGERY: Primary | ICD-10-CM

## 2023-05-09 ENCOUNTER — OFFICE VISIT (OUTPATIENT)
Dept: ORTHOPEDICS | Facility: CLINIC | Age: 48
End: 2023-05-09
Payer: OTHER GOVERNMENT

## 2023-05-09 DIAGNOSIS — Z09 FOLLOW-UP EXAMINATION, FOLLOWING OTHER SURGERY: Primary | ICD-10-CM

## 2023-05-09 PROCEDURE — 99024 POSTOP FOLLOW-UP VISIT: CPT | Mod: ,,, | Performed by: ORTHOPAEDIC SURGERY

## 2023-05-09 PROCEDURE — 99024 PR POST-OP FOLLOW-UP VISIT: ICD-10-PCS | Mod: ,,, | Performed by: ORTHOPAEDIC SURGERY

## 2023-05-09 PROCEDURE — 99212 OFFICE O/P EST SF 10 MIN: CPT | Mod: PBBFAC | Performed by: ORTHOPAEDIC SURGERY

## 2023-05-09 NOTE — PROGRESS NOTES
CC:    Chief Complaint   Patient presents with    Follow-up     RT SHOULDER SCOPE, OPEN ACROMIO, RCR,LABRUM REP 2/1 ([facility])           Previos History :  History:  3/16/2023   Rosa Maria Jaramillo is a 47 y.o.  status post 6 weeks out right shoulder rotator cuff repair near come near complete tear that was opened up repair 1 bio corkscrew 2 push locks plasty right shoulder  date of surgery 02/01/2023 6 weeks ago                 History:  4/11/2023   Rosa Maria Jaramillo is a 47 y.o.  status post 10 weeks surgery was 02/01/2023 out right shoulder anterior labrum repair open acromioplasty rotator cuff repair  patient comes in she is been out of physical therapy for a couple weeks trying doing her own and she is not doing as well as we would hoped she said she went to 15 physical therapy visits and  is not approved anymore as of now         History:  5/9/2023   Rosa Maria Jaramillo is a 47 y.o.  status post status post wrote right rotator cuff repair 1 bio corkscrew 1 push lock  and an anterior labrum repair surgery was on 02/01/2023 3 months ago  did let her go to physical therapy some  more it is helped      PE:   She is doing better forward flexion is 160 abduction is 90 internal rotation to her belt loop      Radiology:  None        Ass/Plan:  Continue to work on it nothing real strenuous she is a 3 months and we will check on her.        Ac Sharp III, MD    Subject to voice recognition errors,  transcription services are not allowed

## 2023-06-20 ENCOUNTER — OFFICE VISIT (OUTPATIENT)
Dept: ORTHOPEDICS | Facility: CLINIC | Age: 48
End: 2023-06-20
Payer: OTHER GOVERNMENT

## 2023-06-20 DIAGNOSIS — Z09 FOLLOW-UP EXAMINATION, FOLLOWING OTHER SURGERY: Primary | ICD-10-CM

## 2023-06-20 PROCEDURE — 99212 PR OFFICE/OUTPT VISIT, EST, LEVL II, 10-19 MIN: ICD-10-PCS | Mod: S$PBB,,, | Performed by: ORTHOPAEDIC SURGERY

## 2023-06-20 PROCEDURE — 99212 OFFICE O/P EST SF 10 MIN: CPT | Mod: PBBFAC | Performed by: ORTHOPAEDIC SURGERY

## 2023-06-20 PROCEDURE — 99212 OFFICE O/P EST SF 10 MIN: CPT | Mod: S$PBB,,, | Performed by: ORTHOPAEDIC SURGERY

## 2023-06-20 NOTE — LETTER
June 20, 2023      [facility]  [address] [facility] [address]  Phone: [phone]  Fax: [phone]       Patient: Rosa Maria Jaramillo   YOB: 1975  Date of Visit: 06/20/2023    To Whom It May Concern:    Daryn Jaramillo  was at [facility] on 06/20/2023. The patient is being held out of  P.T for six months.f you have any questions or concerns, or if I can be of further assistance, please do not hesitate to contact me.    Sincerely,    Shelly Sharp III, M.D.

## 2023-06-20 NOTE — PROGRESS NOTES
CC:    Chief Complaint   Patient presents with    Follow-up     RT SHOULDER SCOPE, OPEN ACROMIO,RCR 2/1 (4[facility])           Previos History     History:  3/16/2023   Rosa Maria Jaramillo is a 47 y.o.  status post 6 weeks out right shoulder rotator cuff repair near come near complete tear that was opened up repair 1 bio corkscrew 2 push locks plasty right shoulder  date of surgery 02/01/2023 6 weeks ago     History:  4/11/2023   Rosa Maria Jaramillo is a 47 y.o.  status post 10 weeks surgery was 02/01/2023 out right shoulder anterior labrum repair open acromioplasty rotator cuff repair  patient comes in she is been out of physical therapy for a couple weeks trying doing her own and she is not doing as well as we would hoped she said she went to 15 physical therapy visits and  is not approved anymore as of now      History:  5/9/2023   Rosa Maria Jaramillo is a 47 y.o.  status post status post wrote right rotator cuff repair 1 bio corkscrew 1 push lock  and an anterior labrum repair surgery was on 02/01/2023 3 months ago  did let her go to physical therapy some  more it is helped      History:  6/20/2023   Rosa Maria Jaramillo is a 47 y.o.  status post 4 months out rotator cuff repair 1 bio corkscrew 2 push locks near complete tear that was open and repaired surgery 02/01/2023  she is pleased doing significantly better      PE:   She has 180° forward flexion abduction is excellent internal rotation is to her belt loop that is the once she is still got a work on      Radiology:  None        Ass/Plan:  She is out the [employer] as far as doing the  he is PT will hold her out for another 6 months I will check on her in 2 months and probably start doing more  activities but as far as the PT test not for another 6 months        Ac Sharp III, MD    Subject to voice recognition errors,  transcription services are not allowed

## 2023-08-03 ENCOUNTER — TELEPHONE (OUTPATIENT)
Dept: OBSTETRICS AND GYNECOLOGY | Facility: CLINIC | Age: 48
End: 2023-08-03
Payer: OTHER GOVERNMENT

## 2023-08-08 DIAGNOSIS — K27.9 PUD (PEPTIC ULCER DISEASE): ICD-10-CM

## 2023-08-08 DIAGNOSIS — R19.5 DARK STOOLS: Primary | ICD-10-CM

## 2023-08-08 DIAGNOSIS — R19.4 ENCOUNTER FOR DIAGNOSTIC COLONOSCOPY DUE TO CHANGE IN BOWEL HABITS: ICD-10-CM

## 2023-08-16 ENCOUNTER — HOSPITAL ENCOUNTER (OUTPATIENT)
Dept: RADIOLOGY | Facility: HOSPITAL | Age: 48
Discharge: HOME | End: 2023-08-16
Attending: STUDENT IN AN ORGANIZED HEALTH CARE EDUCATION/TRAINING PROGRAM
Payer: OTHER GOVERNMENT

## 2023-08-16 DIAGNOSIS — K92.1 MELENA: ICD-10-CM

## 2023-08-16 PROCEDURE — 74177 CT ABDOMEN PELVIS WITH CONTRAST: ICD-10-PCS | Mod: 26,,, | Performed by: RADIOLOGY

## 2023-08-16 PROCEDURE — 74177 CT ABD & PELVIS W/CONTRAST: CPT | Mod: TC

## 2023-08-16 PROCEDURE — 25500020 PHARM REV CODE 255

## 2023-08-16 PROCEDURE — 74177 CT ABD & PELVIS W/CONTRAST: CPT | Mod: 26,,, | Performed by: RADIOLOGY

## 2023-08-16 RX ADMIN — IOPAMIDOL 100 ML: 755 INJECTION, SOLUTION INTRAVENOUS at 08:08

## 2023-08-22 ENCOUNTER — OFFICE VISIT (OUTPATIENT)
Dept: ORTHOPEDICS | Facility: CLINIC | Age: 48
End: 2023-08-22
Payer: OTHER GOVERNMENT

## 2023-08-22 DIAGNOSIS — Z09 FOLLOW-UP EXAMINATION, FOLLOWING OTHER SURGERY: Primary | ICD-10-CM

## 2023-08-22 PROCEDURE — 99213 OFFICE O/P EST LOW 20 MIN: CPT | Mod: S$PBB,,, | Performed by: ORTHOPAEDIC SURGERY

## 2023-08-22 PROCEDURE — 99212 OFFICE O/P EST SF 10 MIN: CPT | Mod: PBBFAC | Performed by: ORTHOPAEDIC SURGERY

## 2023-08-22 PROCEDURE — 99213 PR OFFICE/OUTPT VISIT, EST, LEVL III, 20-29 MIN: ICD-10-PCS | Mod: S$PBB,,, | Performed by: ORTHOPAEDIC SURGERY

## 2023-08-22 NOTE — PROGRESS NOTES
CC:    Chief Complaint   Patient presents with    Follow-up     RT SHOULDER SCOPE OPEN ACROMIO, RCR 2/1 (7 MONTHS)           Previos History :  History:  5/9/2023   Rosa Maria Jaramillo is a 47 y.o.  status post status post wrote right rotator cuff repair 1 bio corkscrew 1 push lock  and an anterior labrum repair surgery was on 02/01/2023 3 months ago  did let her go to physical therapy some  more it is helped        History:  6/20/2023   Rosa Maria Jaramillo is a 47 y.o.  status post 4 months out rotator cuff repair 1 bio corkscrew 2 push locks near complete tear that was open and repaired surgery 02/01/2023  she is pleased doing significantly better         HISTORY: Rosa Maria Keith  is a 45 y.o. status post  Right knee ACL 2013  Left knee ACL reconstruction 06/20/2012.  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     Patient is having left knee pain when she tries to do PT with the  she is requesting help with that     PLAN:  Would recommend trying to avoid running and marching for exercise would recommend swimming and biking which she tolerates she states I am also going placed her on Mobic         History:  8/22/2023   Rosa Maria Jaramillo is a 47 y.o.  status post patient is approximately 6 months out right cuff repair 02/01/2023 1 bio corkscrew 2 push locks        PE:   Shoulder is actually moving well.      Radiology:          Ass/Plan:  Continue to work due shoulder rehab she did have a work restriction note for the  we filled out she has got 2 bad knees for least 1 bad knee and had the recent rotator cuff surgery I recommended against  running during  PT with done that previously and right now we will hold her out of doing pushups and pull-ups also because recent rotator cuff surgery        Ac Sharp III, MD    Subject to voice recognition errors,  transcription services are not allowed

## 2023-09-21 ENCOUNTER — OFFICE VISIT (OUTPATIENT)
Dept: OBSTETRICS AND GYNECOLOGY | Facility: CLINIC | Age: 48
End: 2023-09-21
Payer: OTHER GOVERNMENT

## 2023-09-21 VITALS
HEIGHT: 67 IN | SYSTOLIC BLOOD PRESSURE: 151 MMHG | DIASTOLIC BLOOD PRESSURE: 86 MMHG | HEART RATE: 80 BPM | OXYGEN SATURATION: 98 % | WEIGHT: 185 LBS | BODY MASS INDEX: 29.03 KG/M2 | RESPIRATION RATE: 18 BRPM

## 2023-09-21 DIAGNOSIS — Z01.411 ENCOUNTER FOR GYNECOLOGICAL EXAMINATION (GENERAL) (ROUTINE) WITH ABNORMAL FINDINGS: ICD-10-CM

## 2023-09-21 DIAGNOSIS — N91.5 OLIGOMENORRHEA, UNSPECIFIED TYPE: ICD-10-CM

## 2023-09-21 DIAGNOSIS — Z12.4 SCREENING FOR MALIGNANT NEOPLASM OF THE CERVIX: Primary | ICD-10-CM

## 2023-09-21 DIAGNOSIS — N97.9 INFERTILITY, FEMALE: ICD-10-CM

## 2023-09-21 DIAGNOSIS — N89.8 VAGINAL DRYNESS: ICD-10-CM

## 2023-09-21 DIAGNOSIS — N94.6 DYSMENORRHEA: ICD-10-CM

## 2023-09-21 DIAGNOSIS — N92.1 MENORRHAGIA WITH IRREGULAR CYCLE: ICD-10-CM

## 2023-09-21 PROCEDURE — 99386 PREV VISIT NEW AGE 40-64: CPT | Mod: S$PBB,,, | Performed by: OBSTETRICS & GYNECOLOGY

## 2023-09-21 PROCEDURE — 88142 CYTOPATH C/V THIN LAYER: CPT | Mod: TC,GCY | Performed by: OBSTETRICS & GYNECOLOGY

## 2023-09-21 PROCEDURE — 87624 HUMAN PAPILLOMAVIRUS (HPV): ICD-10-PCS | Mod: ,,, | Performed by: CLINICAL MEDICAL LABORATORY

## 2023-09-21 PROCEDURE — 99386 PR PREVENTIVE VISIT,NEW,40-64: ICD-10-PCS | Mod: S$PBB,,, | Performed by: OBSTETRICS & GYNECOLOGY

## 2023-09-21 PROCEDURE — 87624 HPV HI-RISK TYP POOLED RSLT: CPT | Mod: ,,, | Performed by: CLINICAL MEDICAL LABORATORY

## 2023-09-21 PROCEDURE — 99214 OFFICE O/P EST MOD 30 MIN: CPT | Mod: PBBFAC | Performed by: OBSTETRICS & GYNECOLOGY

## 2023-09-21 PROCEDURE — 88141 CYTOPATH C/V INTERPRET: CPT | Mod: ,,, | Performed by: PATHOLOGY

## 2023-09-21 PROCEDURE — 88141 THINPREP PAP TEST: ICD-10-PCS | Mod: ,,, | Performed by: PATHOLOGY

## 2023-09-21 RX ORDER — ESTRADIOL 0.1 MG/G
CREAM VAGINAL
Qty: 42.5 G | Refills: 1 | Status: SHIPPED | OUTPATIENT
Start: 2023-09-21 | End: 2024-09-09

## 2023-09-21 RX ORDER — TRANEXAMIC ACID 650 MG/1
1300 TABLET ORAL 3 TIMES DAILY
Qty: 30 TABLET | Refills: 11 | Status: SHIPPED | OUTPATIENT
Start: 2023-09-21 | End: 2023-09-26

## 2023-09-21 NOTE — PROGRESS NOTES
Annual Exam    Assessment/Plan:  48 y.o.  presenting for her annual exam:    Problem List Items Addressed This Visit    None  Visit Diagnoses       Screening for malignant neoplasm of the cervix    -  Primary    Relevant Orders    ThinPrep Pap Test    Encounter for gynecological examination (general) (routine) with abnormal findings        Menorrhagia with irregular cycle        Relevant Medications    tranexamic acid (LYSTEDA) 650 mg tablet    Other Relevant Orders    US Pelvis Complete Non OB    Vaginal dryness        Relevant Medications    estradioL (ESTRACE) 0.01 % (0.1 mg/gram) vaginal cream    Oligomenorrhea, unspecified type        Relevant Orders    Follicle Stimulating Hormone (Completed)    Estradiol (Completed)    TSH (Completed)    Prolactin (Completed)    US Pelvis Complete Non OB    Dysmenorrhea        Relevant Orders    US Pelvis Complete Non OB    Infertility, female            Annual exam with PAP performed.  Continue annual mammograms as scheduled.  Due to increasing interval between menses, labs ordered: FSH, estradiol, TSH, and prolactin.   TVUS ordered to assess for any structural causes of heavy, painful, and irregular menses.   TXA for menorrhagia.  Estradiol vaginal cream for vaginal dryness and associated dyspareunia.  F/u in clinic after US and will discuss findings and further plan of care.    Declines any further ED work up.    RTC in 1 year for annual exam and/or prn.    CC:   Chief Complaint   Patient presents with    Infertility     Heavy periods as well       HPI:  48 y.o.  presents for her gynecologic annual exam. Last PAP was more than 1 year ago. She states that her menses have always been irregular, but lately menses are becoming more irregular. She went from April to August of this year with no menses. Then had a very heavy menses in August and had another one in September. She usually does not have pain with menses, but these last 2 she has had moderate back  pain.     Also, she has struggled with infertility for almost a decade. Her first  and her tried for almost 6 years with fertility medications and procedures. They . She remarried. Her current partner and her have been trying for > 2 years. They saw ED in [place], [place]. She tried Letrozole ovulation induction and 2 rounds of IUI without success. ED recommended IVF with donor eggs. They did not want to do it without donor eggs based on her age. However, she declined because she was not ready for that. They have now stopped infertility treatments. They are not preventing pregnancy and would be happy if it happened, but they are emotionally drained and do not want to pursue this any further at this time. She is 49 yo and he is 51 yo.     The menses is what concerns her the most and would like to discuss work up for this.     She also c/o vaginal dryness and pain with sex. Denies any hot flashes or night sweats. She states that she is a hot natured person and has always sweat a lot.     Patient seen and examined.      Health Maintenance:    Birth control: None  Pregnancy plans: Desires   Safe relationship: Yes  Healthy diet: Yes   Exercise: ?  PCP: See below.     Screening:  Last pap smear:  or   History of abnormal pap smears: Denies   STI screening: Declines  Mammogram:  and negative per patient.     Review of Systems: The following ROS was otherwise negative, except as noted in the HPI:  constitutional, HEENT, respiratory, cardiovascular, gastrointestinal, genitourinary, skin, musculoskeletal, neurological, psych    Primary Care Physician: Eddie Donohue MD    Obstetric History  OB History    Para Term  AB Living   1 0 0 0 1     SAB IAB Ectopic Multiple Live Births   0 0 0          # Outcome Date GA Lbr Baldemar/2nd Weight Sex Delivery Anes PTL Lv   1 AB 10/24/94               Gynecologic History  Menstrual History:   LMP: 9/15/2023    Age of Menarche: ?   Age of Menopause: n/a    Menstrual Period: irregular   Interval Between Menses: 1-4 months   Duration of Menses: 7 days    Menstrual Flow: Heavy   Bleeding between menses: no    Sexual History:    Contraception: see above   Currently is sexually active   Denies history of STIs   Denies sexual problems    Pap History:   History of abnormal pap smears: see above   Last pap: see above    Medical History:  Past Medical History:   Diagnosis Date    Postoperative nausea        Medications:  Medication List with Changes/Refills   New Medications    ESTRADIOL (ESTRACE) 0.01 % (0.1 MG/GRAM) VAGINAL CREAM    Place 1 g vaginally every evening for 14 days, THEN 1 g twice a week.    TRANEXAMIC ACID (LYSTEDA) 650 MG TABLET    Take 2 tablets (1,300 mg total) by mouth 3 (three) times daily. for 5 days   Current Medications    AMLODIPINE (NORVASC) 10 MG TABLET        LABETALOL (NORMODYNE) 100 MG TABLET    Take 100 mg by mouth.    LOSARTAN (COZAAR) 25 MG TABLET       Discontinued Medications    DEXAMETHASONE (DECADRON) 4 MG TAB    TAKE 1/2 TABLET DAILY ON DAY 7-14 OF CYCLE    HYDROCODONE-ACETAMINOPHEN (NORCO) 5-325 MG PER TABLET    Take 1 tablet by mouth every 6 (six) hours as needed for Pain.    LETROZOLE (FEMARA) 2.5 MG TAB    TAKE 1 TABLET 2 TIMES A DAY ON DAY 3-7 OF CYCLE    PROGESTERONE (PROMETRIUM) 200 MG CAPSULE    TAKE TWO CAPSULES BY MOUTH DAILY AT BEDTIME ON DAYS 14-26 OF CYCLE         Surgical History:  Past Surgical History:   Procedure Laterality Date    ACROMIOPLASTY Right 2/1/2023    Procedure: ACROMIOPLASTY;  Surgeon: Ac Sharp III, MD;  Location: [facility];  Service: Orthopedics;  Laterality: Right;    APPENDECTOMY  1994    KNEE ARTHROS[place]/ [place]    [place]ROSCOPY W/ ACL RECONSTRUCTION AND PATELLA GRAFT [place]    [place]ROSCOPY W/ ACL RECONSTRUCTION AND PATELLA GRAFT Left 2015    [place]     REPAIR OF LABRUM OF HIP Right 2/1/2023    Procedure: REPAIR, ANTERIOR  "LABRUM,SHOULDER;  Surgeon: Ac Sharp III, MD;  Location: [facility] OR;  Service: Orthopedics;  Laterality: Right;    ROTATOR CUFF REPAIR Right 2/1/2023    Procedure: REPAIR, ROTATOR CUFF;  Surgeon: Ac Sharp III, MD;  Location: [facility] OR;  Service: Orthopedics;  Laterality: Right;    SHOULDER ARTHROSCOPY Right 2/1/2023    Procedure: ARTHROSCOPY, SHOULDER;  Surgeon: Ac Sharp III, MD;  Location: [facility] OR;  Service: Orthopedics;  Laterality: Right;       Allergies:  Review of patient's allergies indicates:   Allergen Reactions    Lisinopril      Other reaction(s): Cough    Penicillins Edema       Family History:  History reviewed. No pertinent family history.    Social History:  Social History     Socioeconomic History    Marital status: Significant Other   Tobacco Use    Smoking status: Never       Objective:  Body mass index is 28.98 kg/m².    BP (!) 151/86   Pulse 80   Resp 18   Ht 5' 7" (1.702 m)   Wt 83.9 kg (185 lb)   SpO2 98%   BMI 28.98 kg/m²     General: Alert, well appearing, no acute distress  Head: Normocephalic, atraumatic  Breasts: Symmetric, non-tender to palpation, no skin changes, palpable axillary lymph nodes or masses noted  Lungs: Unlabored respirations. Clear to auscultation bilaterally.  Cardiovascular: Regular rate and rhythm.  Abdomen: Soft, nontender, nondistended   Pelvic:   External: normal female genitalia, no masses or lesions   Vagina: moist, pink mucosa with rugae, physiologic discharge.  Cervix: no masses or lesions, nontender. PAP performed.  Uterus: approx 8 weeks, mobile, midline, nontender  Adnexa: no masses or fullness, nontender  Rectovaginal: deferred  Extremities: No redness or tenderness  Skin: Well perfused, normal coloration and turgor, no lesions or rashes visualized  Neuro: Alert, oriented, normal speech, no focal deficits, moves extremities appropriately  Psych: No depression or anxiety.      "

## 2023-09-22 PROBLEM — I10 ESSENTIAL (PRIMARY) HYPERTENSION: Status: ACTIVE | Noted: 2020-12-22

## 2023-09-22 PROBLEM — M25.569 KNEE PAIN: Status: ACTIVE | Noted: 2023-08-15

## 2023-09-22 PROBLEM — R19.5 DARK STOOLS: Status: ACTIVE | Noted: 2023-08-02

## 2023-09-22 PROBLEM — M62.81 MUSCLE WEAKNESS: Status: ACTIVE | Noted: 2023-09-22

## 2023-09-22 PROBLEM — N91.5 OLIGOMENORRHEA: Status: ACTIVE | Noted: 2023-08-01

## 2023-09-22 PROBLEM — R10.9 ABDOMINAL PAIN: Status: ACTIVE | Noted: 2023-08-02

## 2023-09-22 PROBLEM — K21.9 GASTROESOPHAGEAL REFLUX DISEASE WITHOUT ESOPHAGITIS: Status: ACTIVE | Noted: 2023-08-01

## 2023-09-22 PROBLEM — H52.7 REFRACTIVE ERROR: Status: ACTIVE | Noted: 2023-09-22

## 2023-09-22 RX ORDER — PHENYLPROPANOLAMINE/CLEMASTINE 75-1.34MG
1 TABLET, EXTENDED RELEASE ORAL 4 TIMES DAILY PRN
COMMUNITY
Start: 2023-08-15

## 2023-09-22 RX ORDER — PANTOPRAZOLE SODIUM 40 MG/1
40 TABLET, DELAYED RELEASE ORAL
COMMUNITY
Start: 2023-07-28

## 2023-09-22 RX ORDER — FLUTICASONE PROPIONATE 50 MCG
1 SPRAY, SUSPENSION (ML) NASAL DAILY PRN
COMMUNITY
Start: 2023-07-10

## 2023-09-25 LAB
GH SERPL-MCNC: ABNORMAL NG/ML
INSULIN SERPL-ACNC: ABNORMAL U[IU]/ML
LAB AP CLINICAL INFORMATION: ABNORMAL
LAB AP GYN INTERPRETATION: ABNORMAL
LAB AP PAP DISCLAIMER COMMENTS: ABNORMAL
RENIN PLAS-CCNC: ABNORMAL NG/ML/H

## 2023-09-28 LAB
HPV 16: NEGATIVE
HPV 18: NEGATIVE
HPV OTHER: NEGATIVE

## 2023-10-16 ENCOUNTER — OFFICE VISIT (OUTPATIENT)
Dept: OBSTETRICS AND GYNECOLOGY | Facility: CLINIC | Age: 48
End: 2023-10-16
Payer: OTHER GOVERNMENT

## 2023-10-16 ENCOUNTER — HOSPITAL ENCOUNTER (OUTPATIENT)
Dept: RADIOLOGY | Facility: HOSPITAL | Age: 48
Discharge: HOME | End: 2023-10-16
Attending: OBSTETRICS & GYNECOLOGY
Payer: OTHER GOVERNMENT

## 2023-10-16 VITALS
OXYGEN SATURATION: 97 % | HEIGHT: 67 IN | DIASTOLIC BLOOD PRESSURE: 78 MMHG | BODY MASS INDEX: 29.03 KG/M2 | WEIGHT: 185 LBS | SYSTOLIC BLOOD PRESSURE: 131 MMHG | HEART RATE: 83 BPM | RESPIRATION RATE: 18 BRPM

## 2023-10-16 DIAGNOSIS — N91.5 OLIGOMENORRHEA, UNSPECIFIED TYPE: ICD-10-CM

## 2023-10-16 DIAGNOSIS — N92.1 MENORRHAGIA WITH IRREGULAR CYCLE: ICD-10-CM

## 2023-10-16 DIAGNOSIS — N94.6 DYSMENORRHEA: ICD-10-CM

## 2023-10-16 DIAGNOSIS — N89.8 VAGINAL DRYNESS: ICD-10-CM

## 2023-10-16 DIAGNOSIS — N92.6 IRREGULAR MENSES: ICD-10-CM

## 2023-10-16 DIAGNOSIS — N95.1 PERIMENOPAUSAL: Primary | ICD-10-CM

## 2023-10-16 PROCEDURE — 99214 OFFICE O/P EST MOD 30 MIN: CPT | Mod: PBBFAC,25 | Performed by: OBSTETRICS & GYNECOLOGY

## 2023-10-16 PROCEDURE — 99213 OFFICE O/P EST LOW 20 MIN: CPT | Mod: S$PBB,,, | Performed by: OBSTETRICS & GYNECOLOGY

## 2023-10-16 PROCEDURE — 76856 US EXAM PELVIC COMPLETE: CPT | Mod: TC

## 2023-10-16 PROCEDURE — 76856 US PELVIS COMPLETE NON OB: ICD-10-PCS | Mod: 26,,, | Performed by: RADIOLOGY

## 2023-10-16 PROCEDURE — 76856 US EXAM PELVIC COMPLETE: CPT | Mod: 26,,, | Performed by: RADIOLOGY

## 2023-10-16 PROCEDURE — 99213 PR OFFICE/OUTPT VISIT, EST, LEVL III, 20-29 MIN: ICD-10-PCS | Mod: S$PBB,,, | Performed by: OBSTETRICS & GYNECOLOGY

## 2023-10-16 RX ORDER — MEDROXYPROGESTERONE ACETATE 10 MG/1
10 TABLET ORAL DAILY
Qty: 10 TABLET | Refills: 4 | Status: SHIPPED | OUTPATIENT
Start: 2023-10-16 | End: 2023-10-26

## 2023-11-21 ENCOUNTER — OFFICE VISIT (OUTPATIENT)
Dept: ORTHOPEDICS | Facility: CLINIC | Age: 48
End: 2023-11-21
Payer: OTHER GOVERNMENT

## 2023-11-21 DIAGNOSIS — Z09 FOLLOW-UP EXAMINATION, FOLLOWING OTHER SURGERY: Primary | ICD-10-CM

## 2023-11-21 PROCEDURE — 99212 OFFICE O/P EST SF 10 MIN: CPT | Mod: S$PBB,,, | Performed by: ORTHOPAEDIC SURGERY

## 2023-11-21 PROCEDURE — 99212 OFFICE O/P EST SF 10 MIN: CPT | Mod: PBBFAC | Performed by: ORTHOPAEDIC SURGERY

## 2023-11-21 PROCEDURE — 99212 PR OFFICE/OUTPT VISIT, EST, LEVL II, 10-19 MIN: ICD-10-PCS | Mod: S$PBB,,, | Performed by: ORTHOPAEDIC SURGERY

## 2023-11-21 NOTE — LETTER
November 21, 2023      [facility]  [address] [place] [address]  Phone: [phone]  Fax: [phone]       Patient: Rosa Maria Juan   YOB: 1975  Date of Visit: 11/21/2023    To Whom It May Concern:    Daryn Juan  was at [facility] on 11/21/2023. The patient may return to work/school on 11/22/23 with no push- ups for 6 months. If you have any questions or concerns, or if I can be of further assistance, please do not hesitate to contact me.    Sincerely,    Jennifer Sharp III, M.D.

## 2023-11-21 NOTE — PROGRESS NOTES
CC:    Chief Complaint   Patient presents with    Follow-up     RT SHOULDER SCOPE, OPEN ACROMIO, RCR 2/1 ([facility])           Previos History :     History:  8/22/2023   Rosa Maria Jaramillo is a 47 y.o.  status post patient is approximately 6 months out right cuff repair 02/01/2023 1 bio corkscrew 2 push locks      Ass/Plan:  Continue to work due shoulder rehab she did have a work restriction note for the  we filled out she has got 2 bad knees for least 1 bad knee and had the recent rotator cuff surgery I recommended against running during  PT with done that previously and right now we will hold her out of doing pushups and pull-ups also because recent rotator cuff surgery       History:  11/21/2023   Rosa Maria Juan is a 48 y.o.  status post patient is here because we had her on restrictions of pushups for the  she is got a but due 32 of those in some timeframe and she can not do that she can do about 2 right now currently we will give her a note saying she would no pushups for 6 months as she increases her activities        PE:   She is got excellent forward flexion abduction basically full      Radiology:  None        Ass/Plan:  A note for no pushups for 6 months and will check on her then        Ac Sharp III, MD    Subject to voice recognition errors,  transcription services are not allowed

## 2024-01-29 DIAGNOSIS — R19.5 OTHER FECAL ABNORMALITIES: Primary | ICD-10-CM

## 2024-01-29 DIAGNOSIS — Z12.11 ENCOUNTER FOR SCREENING FOR MALIGNANT NEOPLASM OF COLON: ICD-10-CM

## 2024-05-07 ENCOUNTER — OFFICE VISIT (OUTPATIENT)
Dept: GASTROENTEROLOGY | Facility: CLINIC | Age: 49
End: 2024-05-07
Payer: OTHER GOVERNMENT

## 2024-05-07 VITALS
BODY MASS INDEX: 28.25 KG/M2 | SYSTOLIC BLOOD PRESSURE: 141 MMHG | HEART RATE: 73 BPM | WEIGHT: 180 LBS | HEIGHT: 67 IN | DIASTOLIC BLOOD PRESSURE: 95 MMHG

## 2024-05-07 DIAGNOSIS — K62.5 RECTAL BLEEDING: Primary | ICD-10-CM

## 2024-05-07 DIAGNOSIS — R19.5 OTHER FECAL ABNORMALITIES: ICD-10-CM

## 2024-05-07 DIAGNOSIS — Z12.11 ENCOUNTER FOR SCREENING FOR MALIGNANT NEOPLASM OF COLON: ICD-10-CM

## 2024-05-07 PROCEDURE — 99203 OFFICE O/P NEW LOW 30 MIN: CPT | Mod: S$PBB,,,

## 2024-05-07 PROCEDURE — 99214 OFFICE O/P EST MOD 30 MIN: CPT | Mod: PBBFAC

## 2024-05-07 RX ORDER — POLYETHYLENE GLYCOL 3350, SODIUM SULFATE ANHYDROUS, SODIUM BICARBONATE, SODIUM CHLORIDE, POTASSIUM CHLORIDE 236; 22.74; 6.74; 5.86; 2.97 G/4L; G/4L; G/4L; G/4L; G/4L
4 POWDER, FOR SOLUTION ORAL ONCE
Qty: 4000 ML | Refills: 0 | Status: SHIPPED | OUTPATIENT
Start: 2024-05-07 | End: 2024-05-07

## 2024-05-07 NOTE — PROGRESS NOTES
Gastroenterology Clinic Note    Patient ID: [id number]   Referring MD: Dipak Tran DO   Chief Complaint:   Chief Complaint   Patient presents with    Establish Care     Needs to discuss colonoscopy        History of Present Illness   Rosa Maria Juan is an 48 y.o. WF who is referred for fecal abnormalities.  Patient complains of frequent rectal itching and discomfort.  She experiences bright red blood on the tissue when she wipes.  She denies any blood in her stool.  She denies any rectal pain or pain with bowel movements.  She has intermittent constipation.  She denies any abdominal pain, nausea, vomiting, or weight loss.  No prior endoscopy reported.  She denies any family history of CRC.    Review of Systems   Constitutional:  Negative for weight loss.   Gastrointestinal:  Positive for constipation. Negative for abdominal pain, blood in stool, diarrhea, heartburn, melena, nausea and vomiting.       Past Medical History      Past Medical History:   Diagnosis Date    Postoperative nausea        Past Surgical History     Past Surgical History:   Procedure Laterality Date    ACROMIOPLASTY Right 2/1/2023    Procedure: ACROMIOPLASTY;  Surgeon: Ac Sharp III, MD;  Location: [facility];  Service: Orthopedics;  Laterality: Right;    APPENDECTOMY  1994    KNEE ARTHROSCOPY W/ ACL RECONSTRUCTION Right 2013    ILM    KNEE ARTHROSCOPY W/ ACL RECONSTRUCTION AND PATELLA GRAFT Left 2012    ILM    KNEE ARTHROSCOPY W/ ACL RECONSTRUCTION AND PATELLA GRAFT Left 2015    ILM- ALLOGRAFT     REPAIR OF LABRUM OF HIP Right 2/1/2023    Procedure: REPAIR, ANTERIOR LABRUM,SHOULDER;  Surgeon: Ac Sharp III, MD;  Location: [facility];  Service: Orthopedics;  Laterality: Right;    ROTATOR CUFF REPAIR Right 2/1/2023    Procedure: REPAIR, ROTATOR CUFF;  Surgeon: Ac Sharp III, MD;  Location: [facility];  Service: Orthopedics;  Laterality: Right;    SHOULDER ARTHROSCOPY Right 2/1/2023    Procedure:  ARTHROSCOPY, SHOULDER;  Surgeon: Ac Sharp III, MD;  Location: [facility];  Service: Orthopedics;  Laterality: Right;       Allergies     Review of patient's allergies indicates:   Allergen Reactions    Lisinopril      Other reaction(s): Cough    Penicillins Edema       Immunization History     Immunization History   Administered Date(s) Administered    Anthrax 05/02/2020, 06/25/2020, 12/30/2020    COVID-19, MRNA, LN-S, PF (Pfizer) (Purple Cap) 10/28/2021, 11/19/2021    Hepatitis A, Adult 08/03/1999, 10/04/2000    Hepatitis B, Adult 04/28/2014, 08/03/2014, 11/01/2014, 05/09/2015    Influenza 10/12/2017, 11/12/2020, 12/14/2021    Influenza (Flumist) - Quadrivalent - Intranasal *Preferred* (2-49 years old) 11/02/2013, 01/09/2016    Influenza - Intranasal - Trivalent 10/01/2011    Influenza - Quadrivalent 10/03/2014    Influenza - Quadrivalent - PF *Preferred* (6 months and older) 10/12/2017, 10/12/2018, 11/12/2020, 12/14/2021    Influenza - Trivalent (ADULT) 10/15/2012, 10/03/2019    Influenza - Trivalent - PF (ADULT) 09/25/2016    MMR 08/23/1996, 05/02/2020    Meningococcal 07/30/1996    OPV 08/23/1996    PPD Test 10/10/2000    Td (Adult), Unspecified Formulation 03/17/1999, 06/01/2011    Tdap 06/06/2011, 11/19/2021    Typhoid 11/01/1998    Typhoid - ViCPs 06/25/2020    Yellow Fever 08/23/1996       Past Family History    No family history on file.    Past Social History      Social History     Socioeconomic History    Marital status: Significant Other   Tobacco Use    Smoking status: Never       Current Medications     Outpatient Medications Marked as Taking for the 5/7/24 encounter (Office Visit) with Alyse Vee FNP   Medication Sig Dispense Refill    amLODIPine (NORVASC) 10 MG tablet       estradioL (ESTRACE) 0.01 % (0.1 mg/gram) vaginal cream Place 1 g vaginally every evening for 14 days, THEN 1 g twice a week. 42.5 g 1    fluticasone propionate (FLONASE) 50 mcg/actuation nasal spray 1 spray by  "Each Nostril route daily as needed.      ibuprofen 200 mg Cap Take 1 tablet by mouth 4 (four) times daily as needed.      labetaloL (NORMODYNE) 100 MG tablet Take 100 mg by mouth.      losartan (COZAAR) 25 MG tablet       pantoprazole (PROTONIX) 40 MG tablet Take 40 mg by mouth.          I have reviewed the current medications, allergies, vital signs, past medical and surgical history, family medical history, and social history for this encounter and agree with all findings.    OBJECTIVE    Physical Exam    BP (!) 141/95   Pulse 73   Ht 5' 7" (1.702 m)   Wt 81.6 kg (180 lb)   BMI 28.19 kg/m²   GEN: Well appearing, cooperative, NAD  NECK: Supple, no LAD  CV: Normal rate  RESP: Unlabored  ABD: ND, no guarding  EXT: No clubbing, cyanosis, or edema  SKIN: Warm and dry  NEURO: AAO x4.     LABS    CBC (with or without Differential):   Lab Results   Component Value Date    WBC 7.77 05/07/2024    HGB 13.9 05/07/2024    HCT 41.6 05/07/2024    MCV 90.2 05/07/2024    MCH 30.2 05/07/2024    MCHC 33.4 05/07/2024    RDW 12.4 05/07/2024     05/07/2024    MPV 9.5 05/07/2024    NEUTOPHILPCT 58.3 05/07/2024    DIFFTYPE Auto 05/07/2024     BMP/CMP:   Lab Results   Component Value Date     05/07/2024    K 4.1 05/07/2024     05/07/2024    CO2 30 05/07/2024    BUN 25 (H) 05/07/2024    CREATININE 0.70 05/07/2024    GLU 83 05/07/2024    CALCIUM 9.8 05/07/2024    ALBUMIN 4.1 05/07/2024    AST 33 05/07/2024    ALT 58 (H) 05/07/2024    ALKPHOS 66 05/07/2024        IMAGING  No pertinent imaging available.    ASSESSMENT  Rosa Maria Juan is a 48 y.o. WF with history of hypertension and GERD who is referred for fecal abnormalities.    1. Rectal bleeding    2. Other fecal abnormalities    3. Encounter for screening for malignant neoplasm of colon           PLAN    - over-the-counter preparation H for symptom relief;   - schedule colonoscopy for evaluation of rectal bleeding; rule out malignancy    There are no Patient " Instructions on file for this visit.      Orders Placed This Encounter   Procedures    CBC Auto Differential     Standing Status:   Future     Number of Occurrences:   1     Standing Expiration Date:   7/6/2025    Comprehensive Metabolic Panel     Standing Status:   Future     Number of Occurrences:   1     Standing Expiration Date:   7/6/2025    Ferritin     Standing Status:   Future     Number of Occurrences:   1     Standing Expiration Date:   7/7/2025    Iron and TIBC     Standing Status:   Future     Number of Occurrences:   1     Standing Expiration Date:   7/7/2025         The risks and benefits of my recommendations, as well as other treatment options were discussed with the patient today. All questions were answered.    35 minutes of total time spent on the encounter, which includes face to face time and non-face to face time preparing to see the patient (eg, review of tests), obtaining and/or reviewing separately obtained history, documenting clinical information in the electronic or other health record, Independently interpreting results (not separately reported) and communicating results to the patient/family/caregiver, or care coordination (not separately reported).        LILY BlockP/ACNP  [facility]

## 2024-05-20 ENCOUNTER — ANESTHESIA EVENT (OUTPATIENT)
Dept: GASTROENTEROLOGY | Facility: HOSPITAL | Age: 49
End: 2024-05-20
Payer: OTHER GOVERNMENT

## 2024-05-20 ENCOUNTER — HOSPITAL ENCOUNTER (OUTPATIENT)
Dept: GASTROENTEROLOGY | Facility: HOSPITAL | Age: 49
Discharge: HOME | End: 2024-05-20
Admitting: INTERNAL MEDICINE
Payer: OTHER GOVERNMENT

## 2024-05-20 ENCOUNTER — ANESTHESIA (OUTPATIENT)
Dept: GASTROENTEROLOGY | Facility: HOSPITAL | Age: 49
End: 2024-05-20
Payer: OTHER GOVERNMENT

## 2024-05-20 VITALS
OXYGEN SATURATION: 100 % | DIASTOLIC BLOOD PRESSURE: 75 MMHG | WEIGHT: 174 LBS | HEART RATE: 62 BPM | TEMPERATURE: 98 F | SYSTOLIC BLOOD PRESSURE: 120 MMHG | HEIGHT: 67 IN | BODY MASS INDEX: 27.31 KG/M2 | RESPIRATION RATE: 15 BRPM

## 2024-05-20 DIAGNOSIS — K62.5 RECTAL BLEEDING: ICD-10-CM

## 2024-05-20 LAB
B-HCG UR QL: NEGATIVE
CTP QC/QA: YES

## 2024-05-20 PROCEDURE — 63600175 PHARM REV CODE 636 W HCPCS: Performed by: ANESTHESIOLOGY

## 2024-05-20 PROCEDURE — D9220A PRA ANESTHESIA: Mod: ,,, | Performed by: ANESTHESIOLOGY

## 2024-05-20 PROCEDURE — 37000009 HC ANESTHESIA EA ADD 15 MINS

## 2024-05-20 PROCEDURE — 88305 TISSUE EXAM BY PATHOLOGIST: CPT | Mod: 26,,, | Performed by: PATHOLOGY

## 2024-05-20 PROCEDURE — 45380 COLONOSCOPY AND BIOPSY: CPT | Performed by: INTERNAL MEDICINE

## 2024-05-20 PROCEDURE — 45380 COLONOSCOPY AND BIOPSY: CPT | Mod: ,,, | Performed by: INTERNAL MEDICINE

## 2024-05-20 PROCEDURE — 37000008 HC ANESTHESIA 1ST 15 MINUTES

## 2024-05-20 PROCEDURE — 25000003 PHARM REV CODE 250: Performed by: ANESTHESIOLOGY

## 2024-05-20 PROCEDURE — 81025 URINE PREGNANCY TEST: CPT | Performed by: INTERNAL MEDICINE

## 2024-05-20 PROCEDURE — 88305 TISSUE EXAM BY PATHOLOGIST: CPT | Mod: TC,SUR | Performed by: INTERNAL MEDICINE

## 2024-05-20 PROCEDURE — 00811 ANES LWR INTST NDSC NOS: CPT

## 2024-05-20 RX ORDER — SODIUM CHLORIDE 9 MG/ML
INJECTION, SOLUTION INTRAVENOUS CONTINUOUS PRN
Status: DISCONTINUED | OUTPATIENT
Start: 2024-05-20 | End: 2024-05-20

## 2024-05-20 RX ORDER — SODIUM CHLORIDE 0.9 % (FLUSH) 0.9 %
10 SYRINGE (ML) INJECTION
Status: DISCONTINUED | OUTPATIENT
Start: 2024-05-20 | End: 2024-05-21 | Stop reason: HOSPADM

## 2024-05-20 RX ORDER — LIDOCAINE HYDROCHLORIDE 20 MG/ML
INJECTION, SOLUTION EPIDURAL; INFILTRATION; INTRACAUDAL; PERINEURAL
Status: DISCONTINUED | OUTPATIENT
Start: 2024-05-20 | End: 2024-05-20

## 2024-05-20 RX ORDER — PROPOFOL 10 MG/ML
VIAL (ML) INTRAVENOUS
Status: DISCONTINUED | OUTPATIENT
Start: 2024-05-20 | End: 2024-05-20

## 2024-05-20 RX ADMIN — PROPOFOL 100 MG: 10 INJECTION, EMULSION INTRAVENOUS at 09:05

## 2024-05-20 RX ADMIN — SODIUM CHLORIDE: 9 INJECTION, SOLUTION INTRAVENOUS at 09:05

## 2024-05-20 RX ADMIN — LIDOCAINE HYDROCHLORIDE 100 MG: 20 INJECTION, SOLUTION INTRAVENOUS at 09:05

## 2024-05-20 NOTE — TRANSFER OF CARE
"Anesthesia Transfer of Care Note    Patient: Rosa Maria Juan    Procedure(s) Performed: * colonoscopy*    Patient location: GI    Anesthesia Type: MAC    Transport from OR: Transported from OR on room air with adequate spontaneous ventilation    Post pain: adequate analgesia    Post assessment: no apparent anesthetic complications    Post vital signs: stable    Level of consciousness: awake and responds to stimulation    Nausea/Vomiting: no nausea/vomiting    Complications: none    Transfer of care protocol was followed      Last vitals: Visit Vitals  BP 99/64   Pulse 70   Temp 36.7 °C (98 °F) (Oral)   Resp 19   Ht 5' 7" (1.702 m)   Wt 78.9 kg (174 lb)   SpO2 98%   Breastfeeding No   BMI 27.25 kg/m²     "

## 2024-05-20 NOTE — DISCHARGE INSTRUCTIONS
Procedure Date  5/20/24     Impression  Overall Impression:   Subcentimeter polyp in the rectum was removed with cold forceps biopsy  The terminal ileum, ileocecal valve, cecum, ascending colon, transverse colon, descending colon and sigmoid colon appeared normal.  (grade 2) hemorrhoids        Recommendation  Await pathology results   Repeat colonoscopy in 7 years  Intermittent rectal bleeding likely related to irritation of internal hemorrhoids; recommend daily bowel regimen as below     Start a daily fiber supplement with Citrucel or Fibercon (can purchase at your local pharmacy)  Use Miralax 17 grams daily-to-twice daily as needed to have a regular, soft BM without straining  Drink at least 60-80 ounces of water daily unless you have a medical condition that requires fluid restriction      Outcome of procedure: successful Colonoscopy  Disposition: patient to recovery following procedure; discharge to home when appropriate parameters met  Provisions for follow up: please call my office for any unexpected symptoms like chest or abdominal pain or bleeding following your procedure.  Final Diagnosis: colon polyp      Indication  47 yo WF with intermittent BRBPR here for index colonoscopy.      Providers  Sarah Hough CRNA CRNA Leach, Kelsey M., RN Registered Nurse   Nazario Mckenzie MD Proceduralist   Desi Gill RN Registered Nurse       
Normal genitalia; no lesions; no discharge

## 2024-05-20 NOTE — ANESTHESIA POSTPROCEDURE EVALUATION
Anesthesia Post Evaluation    Patient: Rosa Maria Juan    Procedure(s) Performed: * colonoscopy*    Final Anesthesia Type: MAC      Patient location during evaluation: GI PACU  Patient participation: Yes- Able to Participate  Level of consciousness: awake and alert, oriented and awake  Post-procedure vital signs: reviewed and stable  Pain management: adequate  Airway patency: patent    PONV status at discharge: No PONV  Anesthetic complications: no      Cardiovascular status: blood pressure returned to baseline, hemodynamically stable and stable  Respiratory status: unassisted and spontaneous ventilation  Hydration status: euvolemic  Follow-up not needed.              Vitals Value Taken Time   /75 05/20/24 1001   Temp 36.7 °C (98 °F) 05/20/24 0929   Pulse 61 05/20/24 1001   Resp 10 05/20/24 1001   SpO2 100 % 05/20/24 1001   Vitals shown include unfiled device data.      Event Time   Out of Recovery 09:55:00         Pain/Raza Score: Raza Score: 10 (5/20/2024  9:50 AM)

## 2024-05-20 NOTE — ANESTHESIA PREPROCEDURE EVALUATION
05/20/2024  Rosa Maria Juan is a 48 y.o., female.      Pre-op Assessment    I have reviewed the Patient Summary Reports.     I have reviewed the Nursing Notes. I have reviewed the NPO Status.   I have reviewed the Medications.     Review of Systems  Anesthesia Hx:  No problems with previous Anesthesia                Social:  Non-Smoker       Hematology/Oncology:  Hematology Normal   Oncology Normal                                   EENT/Dental:  EENT/Dental Normal           Cardiovascular:     Hypertension                                        Pulmonary:  Pulmonary Normal                       Renal/:  Renal/ Normal                 Hepatic/GI:     GERD             Musculoskeletal:  Musculoskeletal Normal                Neurological:  Neurology Normal                                      Endocrine:  Endocrine Normal            Dermatological:  Skin Normal    Psych:  Psychiatric Normal                    Physical Exam  General: Well nourished, Cooperative, Alert and Oriented    Airway:  Mallampati: II / II  Mouth Opening: Normal  TM Distance: > 6 cm  Tongue: Normal  Neck ROM: Normal ROM    Chest/Lungs:  Clear to auscultation, Normal Respiratory Rate    Heart:  Rate: Normal  Rhythm: Regular Rhythm      Current Outpatient Medications on File Prior to Encounter   Medication Sig Dispense Refill    amLODIPine (NORVASC) 10 MG tablet       estradioL (ESTRACE) 0.01 % (0.1 mg/gram) vaginal cream Place 1 g vaginally every evening for 14 days, THEN 1 g twice a week. 42.5 g 1    fluticasone propionate (FLONASE) 50 mcg/actuation nasal spray 1 spray by Each Nostril route daily as needed.      ibuprofen 200 mg Cap Take 1 tablet by mouth 4 (four) times daily as needed.      labetaloL (NORMODYNE) 100 MG tablet Take 100 mg by mouth.      losartan (COZAAR) 25 MG tablet       medroxyPROGESTERone (PROVERA) 10 MG tablet Take  1 tablet (10 mg total) by mouth once daily. for 10 days 10 tablet 4    pantoprazole (PROTONIX) 40 MG tablet Take 40 mg by mouth.       No current facility-administered medications on file prior to encounter.        Anesthesia Plan  Type of Anesthesia, risks & benefits discussed:    Anesthesia Type: MAC  Intra-op Monitoring Plan: Standard ASA Monitors  Post Op Pain Control Plan: multimodal analgesia  Induction:  IV  Informed Consent: Informed consent signed with the Patient and all parties understand the risks and agree with anesthesia plan.  All questions answered.   ASA Score: 2  Day of Surgery Review of History & Physical: H&P Update referred to the surgeon/provider.I have interviewed and examined the patient. I have reviewed the patient's H&P dated: There are no significant changes. H&P completed by Anesthesiologist.    Ready For Surgery From Anesthesia Perspective.     .

## 2024-05-20 NOTE — H&P
Gastroenterology Pre-procedure H&P    History of Present Illness    Rosa Maria Juan is a 48 y.o. female that  has a past medical history of Hypertension and Postoperative nausea.     Patient with rectal bleeding here for index colonoscopy.     Patient denies wt loss, abdominal pain, diarrhea, change in stool caliber, no anticoagulants, FMHx of GI related malignancies.      Past Medical History:   Diagnosis Date    Hypertension     Postoperative nausea        Past Surgical History:   Procedure Laterality Date    ACROMIOPLASTY Right 2/1/2023    Procedure: ACROMIOPLASTY;  Surgeon: Ac Sharp III, MD;  Location: [facility] OR;  Service: Orthopedics;  Laterality: Right;    APPENDECTOMY  1994    KNEE ARTHROSCOPY W/ ACL RECONSTRUCTION Right 2013    ILM    KNEE ARTHROSCOPY W/ ACL RECONSTRUCTION AND PATELLA GRAFT Left 2012    ILM    KNEE ARTHROSCOPY W/ ACL RECONSTRUCTION AND PATELLA GRAFT Left 2015    ILM- ALLOGRAFT     REPAIR OF LABRUM OF HIP Right 2/1/2023    Procedure: REPAIR, ANTERIOR LABRUM,SHOULDER;  Surgeon: Ac Sharp III, MD;  Location: [facility] OR;  Service: Orthopedics;  Laterality: Right;    ROTATOR CUFF REPAIR Right 2/1/2023    Procedure: REPAIR, ROTATOR CUFF;  Surgeon: Ac Sharp III, MD;  Location: [facility] OR;  Service: Orthopedics;  Laterality: Right;    SHOULDER ARTHROSCOPY Right 2/1/2023    Procedure: ARTHROSCOPY, SHOULDER;  Surgeon: Ac Sharp III, MD;  Location: [facility] OR;  Service: Orthopedics;  Laterality: Right;       No family history on file.    Social History     Socioeconomic History    Marital status: Significant Other   Tobacco Use    Smoking status: Never   Substance and Sexual Activity    Alcohol use: Yes    Drug use: Never       Current Outpatient Medications   Medication Sig Dispense Refill    amLODIPine (NORVASC) 10 MG tablet       estradioL (ESTRACE) 0.01 % (0.1 mg/gram) vaginal cream Place 1 g vaginally every evening for 14 days, THEN 1 g  "twice a week. 42.5 g 1    fluticasone propionate (FLONASE) 50 mcg/actuation nasal spray 1 spray by Each Nostril route daily as needed.      ibuprofen 200 mg Cap Take 1 tablet by mouth 4 (four) times daily as needed.      labetaloL (NORMODYNE) 100 MG tablet Take 100 mg by mouth.      losartan (COZAAR) 25 MG tablet       medroxyPROGESTERone (PROVERA) 10 MG tablet Take 1 tablet (10 mg total) by mouth once daily. for 10 days 10 tablet 4    pantoprazole (PROTONIX) 40 MG tablet Take 40 mg by mouth.       No current facility-administered medications for this encounter.       Review of patient's allergies indicates:   Allergen Reactions    Lisinopril      Other reaction(s): Cough    Penicillins Edema       Objective:  Vitals:    05/20/24 0812   BP: 124/76   Pulse: 62   Resp: 14   Temp: 97.7 °F (36.5 °C)   TempSrc: Oral   SpO2: 98%   Weight: 78.9 kg (174 lb)   Height: 5' 7" (1.702 m)        GEN: normal appearing, NAD, AAO x3  HENT: NCAT, anicteric, OP benign  CV: normal rate, regular rhythm  RESP: NABS, symmetric rise, unlabored  ABD: soft, ND, no guarding or TTP  SKIN: warm and dry  NEURO: grossly afocal    Assessment and Plan:    Proceed with:    Colonoscopy for rectal bleeding    Nazario Mckenzie MD  Gastroenterology  "

## 2024-05-21 LAB
ESTROGEN SERPL-MCNC: NORMAL PG/ML
INSULIN SERPL-ACNC: NORMAL U[IU]/ML
LAB AP GROSS DESCRIPTION: NORMAL
LAB AP LABORATORY NOTES: NORMAL
T3RU NFR SERPL: NORMAL %

## 2024-08-08 ENCOUNTER — OFFICE VISIT (OUTPATIENT)
Dept: GASTROENTEROLOGY | Facility: CLINIC | Age: 49
End: 2024-08-08
Payer: OTHER GOVERNMENT

## 2024-08-08 VITALS
RESPIRATION RATE: 18 BRPM | HEART RATE: 70 BPM | WEIGHT: 176 LBS | DIASTOLIC BLOOD PRESSURE: 83 MMHG | HEIGHT: 67 IN | BODY MASS INDEX: 27.62 KG/M2 | SYSTOLIC BLOOD PRESSURE: 126 MMHG

## 2024-08-08 DIAGNOSIS — K62.5 RECTAL BLEEDING: ICD-10-CM

## 2024-08-08 DIAGNOSIS — K64.8 INTERNAL HEMORRHOIDS: Primary | ICD-10-CM

## 2024-08-08 PROCEDURE — 99214 OFFICE O/P EST MOD 30 MIN: CPT | Mod: S$PBB,,,

## 2024-08-08 PROCEDURE — 99213 OFFICE O/P EST LOW 20 MIN: CPT | Mod: PBBFAC

## 2024-08-08 PROCEDURE — 99999 PR PBB SHADOW E&M-EST. PATIENT-LVL III: CPT | Mod: PBBFAC,,,

## 2024-08-19 PROBLEM — K62.5 RECTAL BLEEDING: Status: RESOLVED | Noted: 2024-05-20 | Resolved: 2024-08-19

## 2024-09-24 ENCOUNTER — OFFICE VISIT (OUTPATIENT)
Dept: OBSTETRICS AND GYNECOLOGY | Facility: CLINIC | Age: 49
End: 2024-09-24
Payer: OTHER GOVERNMENT

## 2024-09-24 VITALS
HEART RATE: 70 BPM | OXYGEN SATURATION: 98 % | BODY MASS INDEX: 27.6 KG/M2 | HEIGHT: 67 IN | DIASTOLIC BLOOD PRESSURE: 82 MMHG | SYSTOLIC BLOOD PRESSURE: 137 MMHG | WEIGHT: 175.81 LBS

## 2024-09-24 DIAGNOSIS — Z01.419 WELL WOMAN EXAM WITH ROUTINE GYNECOLOGICAL EXAM: ICD-10-CM

## 2024-09-24 DIAGNOSIS — Z12.31 ENCOUNTER FOR SCREENING MAMMOGRAM FOR BREAST CANCER: ICD-10-CM

## 2024-09-24 DIAGNOSIS — N89.8 VAGINAL DRYNESS: ICD-10-CM

## 2024-09-24 DIAGNOSIS — N95.1 PERIMENOPAUSAL VASOMOTOR SYMPTOMS: ICD-10-CM

## 2024-09-24 DIAGNOSIS — N94.10 DYSPAREUNIA, FEMALE: ICD-10-CM

## 2024-09-24 DIAGNOSIS — N92.6 IRREGULAR MENSES: ICD-10-CM

## 2024-09-24 DIAGNOSIS — Z12.4 SCREENING FOR MALIGNANT NEOPLASM OF THE CERVIX: Primary | ICD-10-CM

## 2024-09-24 DIAGNOSIS — N81.89 PELVIC FLOOR RELAXATION: ICD-10-CM

## 2024-09-24 PROCEDURE — 99999 PR PBB SHADOW E&M-EST. PATIENT-LVL IV: CPT | Mod: PBBFAC,,, | Performed by: OBSTETRICS & GYNECOLOGY

## 2024-09-24 PROCEDURE — 88142 CYTOPATH C/V THIN LAYER: CPT | Mod: TC,GCY | Performed by: OBSTETRICS & GYNECOLOGY

## 2024-09-24 PROCEDURE — 99214 OFFICE O/P EST MOD 30 MIN: CPT | Mod: PBBFAC | Performed by: OBSTETRICS & GYNECOLOGY

## 2024-09-24 PROCEDURE — 99396 PREV VISIT EST AGE 40-64: CPT | Mod: S$PBB,,, | Performed by: OBSTETRICS & GYNECOLOGY

## 2024-09-24 RX ORDER — ESTRADIOL 0.1 MG/G
1 CREAM VAGINAL
Qty: 42.5 G | Refills: 1 | Status: SHIPPED | OUTPATIENT
Start: 2024-09-26 | End: 2025-09-26

## 2024-09-24 NOTE — PROGRESS NOTES
"Annual Exam    Assessment/Plan:  49 y.o.  presenting for her annual exam:    Problem List Items Addressed This Visit          Renal/    Vaginal dryness    Relevant Medications    estradioL (ESTRACE) 0.01 % (0.1 mg/gram) vaginal cream (Start on 2024)     Other Visit Diagnoses       Screening for malignant neoplasm of the cervix    -  Primary    Relevant Orders    ThinPrep Pap Test    Well woman exam with routine gynecological exam        Relevant Orders    ThinPrep Pap Test    Encounter for screening mammogram for breast cancer        Relevant Orders    Mammo Digital Screening Bilat    Pelvic floor relaxation        Relevant Orders    Ambulatory referral/consult to Physical/Occupational Therapy    Perimenopausal vasomotor symptoms        Irregular menses        Dyspareunia, female              Annual exam with PAP performed.   Screening MMG ordered.  Estradiol vaginal cream refilled.  Discussed that her irregular menses is likely perimenopausal. Discussed expectant management vs cyclic progesterone. She desires expectant management for now.   Declines treatment for vasomotor symptoms at this time.  Referred to pelvic floor PT for dyspareunia and pelvic floor relaxation.    RTC in 1 year for annual exam and/or prn.    CC:   Chief Complaint   Patient presents with    Annual Exam     C/o cramping and painful intercourse.        HPI:  49 y.o.  presents for her gynecologic annual exam.  LMP July lasted 6 days heavy to light. February before that 6 days with 2 heavy days.  Hot flashes and night sweats occasionally. However, she does not feel like she needs treatment at this time.  She is concerned about possible prolapse b/c after sex her and her  saw some tissue protruding from her vagina that looked like "ridges".   Colonoscopy 2024 and wnl.  MMG ordered. Unsure when her last MMG was.    Mom had possible stroke last night. Therefore, she is under a lot of stress at the moment.    Patient " seen and examined.      Health Maintenance:    Birth control: None  Pregnancy plans: None  Safe relationship: Yes  Healthy diet: ?   Exercise: ?  PCP: See below.     Screening:  Last pap smear: 2023 ASCUS HPV neg  History of abnormal pap smears: Yes  STI screening: Declines  Mammogram: Ordered    Review of Systems: The following ROS was otherwise negative, except as noted in the HPI:  constitutional, HEENT, respiratory, cardiovascular, gastrointestinal, genitourinary, skin, musculoskeletal, neurological, psych    Primary Care Physician: Eddie Donohue MD    Obstetric History  OB History    Para Term  AB Living   1 0 0 0 1     SAB IAB Ectopic Multiple Live Births   0 0 0          # Outcome Date GA Lbr Baldemar/2nd Weight Sex Type Anes PTL Lv   1 AB 10/24/94               Gynecologic History  Menstrual History:   LMP: 2024    Age of Menarche: 14   Age of Menopause: n/a   Menstrual Period: irregular   Interval Between Menses: 1-6 months   Duration of Menses: 7 days    Menstrual Flow: normal to heavy   Bleeding between menses: No    Sexual History:    Contraception: see above   Currently is sexually active   Denies history of STIs   Denies sexual problems    Pap History:   History of abnormal pap smears: see above   Last pap: see above    Medical History:  Past Medical History:   Diagnosis Date    Hypertension     Postoperative nausea        Medications:  Medication List with Changes/Refills   Current Medications    AMLODIPINE (NORVASC) 10 MG TABLET        IBUPROFEN 200 MG CAP    Take 1 tablet by mouth 4 (four) times daily as needed.    LABETALOL (NORMODYNE) 100 MG TABLET    Take 100 mg by mouth.    PANTOPRAZOLE (PROTONIX) 40 MG TABLET    Take 40 mg by mouth.   Changed and/or Refilled Medications    Modified Medication Previous Medication    ESTRADIOL (ESTRACE) 0.01 % (0.1 MG/GRAM) VAGINAL CREAM estradioL (ESTRACE) 0.01 % (0.1 mg/gram) vaginal cream       Place 1 g vaginally twice a week.    Place 1 g  vaginally every evening for 14 days, THEN 1 g twice a week.   Discontinued Medications    FLUTICASONE PROPIONATE (FLONASE) 50 MCG/ACTUATION NASAL SPRAY    1 spray by Each Nostril route daily as needed.    LOSARTAN (COZAAR) 25 MG TABLET        MEDROXYPROGESTERONE (PROVERA) 10 MG TABLET    Take 1 tablet (10 mg total) by mouth once daily. for 10 days         Surgical History:  Past Surgical History:   Procedure Laterality Date    ACROMIOPLASTY Right 02/01/2023    Procedure: ACROMIOPLASTY;  Surgeon: Ac Sharp III, MD;  Location: [facility] OR;  Service: Orthopedics;  Laterality: Right;    APPENDECTOMY  1994    KNEE ARTHROSCOPY W/ ACL RECONSTRUCTION Right 2013    ILM    KNEE ARTHROSCOPY W/ ACL RECONSTRUCTION AND PATELLA GRAFT Left 2012    ILM    KNEE ARTHROSCOPY W/ ACL RECONSTRUCTION AND PATELLA GRAFT Left 2015    ILM- ALLOGRAFT     REPAIR OF LABRUM OF HIP Right 02/01/2023    Procedure: REPAIR, ANTERIOR LABRUM,SHOULDER;  Surgeon: Ac Sharp III, MD;  Location: [facility] OR;  Service: Orthopedics;  Laterality: Right;    ROTATOR CUFF REPAIR Right 02/01/2023    Procedure: REPAIR, ROTATOR CUFF;  Surgeon: Ac Sharp III, MD;  Location: [facility] OR;  Service: Orthopedics;  Laterality: Right;    SHOULDER ARTHROSCOPY Right 02/01/2023    Procedure: ARTHROSCOPY, SHOULDER;  Surgeon: Ac Sharp III, MD;  Location: [facility] OR;  Service: Orthopedics;  Laterality: Right;       Allergies:  Review of patient's allergies indicates:   Allergen Reactions    Lisinopril      Other reaction(s): Cough    Penicillins Edema       Family History:  Family History   Problem Relation Name Age of Onset    Hypertension Paternal Grandfather      Hypertension Paternal Grandmother      Cancer Maternal Grandmother      Cancer Maternal Grandfather      Diabetes Maternal Grandfather      Hypertension Father      Hypertension Mother      Diabetes Mother      Hypertension Brother      Hypertension Sister      No Known  "Problems Other         Social History:  Social History     Socioeconomic History    Marital status: Significant Other   Tobacco Use    Smoking status: Never   Substance and Sexual Activity    Alcohol use: Yes    Drug use: Not Currently    Sexual activity: Yes     Partners: Male     Birth control/protection: None       Objective:  Body mass index is 27.53 kg/m².    /82 (BP Location: Right arm, Patient Position: Sitting)   Pulse 70   Ht 5' 7" (1.702 m)   Wt 79.7 kg (175 lb 12.8 oz)   LMP 07/23/2024 (Approximate)   SpO2 98%   BMI 27.53 kg/m²     General: Alert, well appearing, no acute distress  Head: Normocephalic, atraumatic  Breasts: Symmetric, non-tender to palpation, no skin changes, palpable axillary lymph nodes or masses noted  Lungs: Unlabored respirations. Clear to auscultation bilaterally.  Cardiovascular: Regular rate and rhythm.   Abdomen: Soft, nontender, nondistended   Pelvic: Exam chaperoned by female assistant.   External: normal female genitalia, no masses or lesions   Vagina: moist, pink mucosa with decreased rugae, physiologic discharge. No cystocele or rectocele. No evidence of prolapse. Mild pelvic floor relaxation.   Cervix: no masses or lesions, nontender. PAP performed.  Uterus: approx 8 weeks, mobile, midline, nontender  Adnexa: no masses or fullness, nontender  Rectovaginal: deferred  Extremities: No redness or tenderness  Skin: Well perfused, normal coloration and turgor, no lesions or rashes visualized  Neuro: Alert, oriented, normal speech, no focal deficits, moves extremities appropriately  Psych: Normal mood and behavior.     Zak Manriquez MD     "

## 2024-09-25 LAB
GH SERPL-MCNC: NORMAL NG/ML
INSULIN SERPL-ACNC: NORMAL U[IU]/ML
LAB AP CLINICAL INFORMATION: NORMAL
LAB AP GYN INTERPRETATION: NEGATIVE
LAB AP PAP DISCLAIMER COMMENTS: NORMAL
RENIN PLAS-CCNC: NORMAL NG/ML/H

## 2024-09-27 LAB
HPV 16: NEGATIVE
HPV 18: NEGATIVE
HPV OTHER: NEGATIVE

## 2025-03-18 ENCOUNTER — TELEPHONE (OUTPATIENT)
Dept: ORTHOPEDICS | Facility: CLINIC | Age: 50
End: 2025-03-18
Payer: OTHER GOVERNMENT

## 2025-03-18 ENCOUNTER — HOSPITAL ENCOUNTER (OUTPATIENT)
Dept: RADIOLOGY | Facility: HOSPITAL | Age: 50
Discharge: HOME | End: 2025-03-18
Attending: ORTHOPAEDIC SURGERY
Payer: OTHER GOVERNMENT

## 2025-03-18 ENCOUNTER — OFFICE VISIT (OUTPATIENT)
Dept: ORTHOPEDICS | Facility: CLINIC | Age: 50
End: 2025-03-18
Payer: OTHER GOVERNMENT

## 2025-03-18 VITALS
HEART RATE: 72 BPM | TEMPERATURE: 98 F | SYSTOLIC BLOOD PRESSURE: 154 MMHG | WEIGHT: 179 LBS | BODY MASS INDEX: 28.09 KG/M2 | DIASTOLIC BLOOD PRESSURE: 90 MMHG | OXYGEN SATURATION: 100 % | HEIGHT: 67 IN

## 2025-03-18 DIAGNOSIS — M25.519 SHOULDER PAIN, UNSPECIFIED CHRONICITY, UNSPECIFIED LATERALITY: ICD-10-CM

## 2025-03-18 DIAGNOSIS — M25.511 RIGHT SHOULDER PAIN, UNSPECIFIED CHRONICITY: Primary | ICD-10-CM

## 2025-03-18 DIAGNOSIS — M25.511 RIGHT SHOULDER PAIN, UNSPECIFIED CHRONICITY: ICD-10-CM

## 2025-03-18 PROCEDURE — 73030 X-RAY EXAM OF SHOULDER: CPT | Mod: 26,LT,, | Performed by: ORTHOPAEDIC SURGERY

## 2025-03-18 PROCEDURE — 99999 PR PBB SHADOW E&M-EST. PATIENT-LVL IV: CPT | Mod: PBBFAC,,, | Performed by: ORTHOPAEDIC SURGERY

## 2025-03-18 PROCEDURE — 99214 OFFICE O/P EST MOD 30 MIN: CPT | Mod: PBBFAC,25 | Performed by: ORTHOPAEDIC SURGERY

## 2025-03-18 PROCEDURE — 73030 X-RAY EXAM OF SHOULDER: CPT | Mod: TC,LT

## 2025-03-18 PROCEDURE — 99214 OFFICE O/P EST MOD 30 MIN: CPT | Mod: S$PBB,,, | Performed by: ORTHOPAEDIC SURGERY

## 2025-03-18 NOTE — TELEPHONE ENCOUNTER
----- Message from Rosy sent at 3/18/2025 12:21 PM CDT -----  Regarding: talk to the nurse  Who Called: Rosa Maria Marino is requesting assistance/information from provider's office.Symptoms (please be specific): Patient would like a copy of records and would like a call back Preferred Method of Contact: Phone CallPatient's Preferred Phone Number on File: [phone] Best Call Back Number, if different:Additional Information:

## 2025-03-18 NOTE — PROGRESS NOTES
CC:   Chief Complaint   Patient presents with    Left Upper Arm - Pain        PREVIOUS INFO:  Right shoulder rotator cuff repair 1 bio corkscrew 2 push locks 02/01/2023  Right knee ACL 2013  Left knee ACL reconstruction 06/20/2012.  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          HISTORY:   3/18/2025    Rosa Maria Juan  is a 49 y.o. patient comes in with left shoulder pain has been going on for least a year we actually did the opposite shoulder back in 23 we had released her she has been in physical therapy on and off for the last year and they were working on strengthening she was doing basically like bench press that is started having severe left shoulder pain pain at night pain with use      PAST MEDICAL HISTORY:   Past Medical History:   Diagnosis Date    Hypertension     Postoperative nausea           PAST SURGICAL HISTORY:   Past Surgical History:   Procedure Laterality Date    ACROMIOPLASTY Right 02/01/2023    Procedure: ACROMIOPLASTY;  Surgeon: Ac Sharp III, MD;  Location: [facility];  Service: Orthopedics;  Laterality: Right;    APPENDECTOMY  1994    KNEE ARTHROSCOPY W/ ACL RECONSTRUCTION Right 2013    ILM    KNEE ARTHROSCOPY W/ ACL RECONSTRUCTION AND PATELLA GRAFT Left 2012    ILM    KNEE ARTHROSCOPY W/ ACL RECONSTRUCTION AND PATELLA GRAFT Left 2015    ILM- ALLOGRAFT     REPAIR OF LABRUM OF HIP Right 02/01/2023    Procedure: REPAIR, ANTERIOR LABRUM,SHOULDER;  Surgeon: Ac Sharp III, MD;  Location: [facility] OR;  Service: Orthopedics;  Laterality: Right;    ROTATOR CUFF REPAIR Right 02/01/2023    Procedure: REPAIR, ROTATOR CUFF;  Surgeon: Ac Sharp III, MD;  Location: [facility] OR;  Service: Orthopedics;  Laterality: Right;    SHOULDER ARTHROSCOPY Right 02/01/2023    Procedure: ARTHROSCOPY, SHOULDER;  Surgeon: Ac Sharp III, MD;  Location: [facility]  OR;  Service: Orthopedics;  Laterality: Right;          ALLERGIES:   Review of patient's allergies indicates:   Allergen Reactions    Lisinopril      Other reaction(s): Cough    Penicillins Edema        MEDICATIONS :  Current Medications[1]     SOCIAL HISTORY: Social History[2]     ROS    FAMILY HISTORY:   Family History   Problem Relation Name Age of Onset    Hypertension Paternal Grandfather      Hypertension Paternal Grandmother      Cancer Maternal Grandmother      Cancer Maternal Grandfather      Diabetes Maternal Grandfather      Hypertension Father      Hypertension Mother      Diabetes Mother      Hypertension Brother      Hypertension Sister      No Known Problems Other            PHYSICAL EXAM:   Vitals:    03/18/25 0941   BP: (!) 154/90   Pulse: 72   Temp: 97.8 °F (36.6 °C)               Body mass index is 28.04 kg/m².     In general, this is a well-developed, well-nourished female . The patient is alert, oriented and cooperative.      HEENT:  Normocephalic, atraumatic.  Extraocular movements are intact bilaterally.  The oropharynx is benign.       NECK:  Nontender with good range of motion.      PULMONARY: Respirations are even and non-labored.       CARDIOVASCULAR: Pulses regular by peripheral palpation.       ABDOMEN:  Soft, non-tender, non-distended.        EXTREMITIES:  Patient today this is her left shoulder AC joints really nontender subacromial space is tender Forward flexion is limited about 90° with the pain more pain going down from that abduction is about 70 with pain she has decreased strength on external rotation that is significant decreased strength on Timi's testing    Ortho Exam      RADIOGRAPHIC FINDINGS:  March 18, 2025  Left shoulder AP and transscapular lateral glenohumeral joints reduced there is narrowing of the AC joint no fracture dislocations appreciated          Left shoulder MRI April 2024 per my review it was almost a complete tear last year we were not involved in this   DANUTA  .   Impression:     Mild acromioclavicular joint osteoarthrosis.  Partial tearing supraspinatus tendon.  No other evidence of significant shoulder abnormality.        Electronically signed by:Serafin Russo  Date:                                            04/22/2024  Time:                                           16:43      IMPRESSION:  Patient has a near complete tear of rotator cuff last year she has been in physical therapy on and off since then apparently and that is gotten worse over the last month severe pain and weakness    PLAN:  MRI of left shoulder I do think this is going to require surgery she has marked weakness now and pain  I did discuss with the patient and I thinks she is going to end up having surgery here but we are going to repeat the MRI goes he has been over a year and her symptoms are significantly worse      No follow-ups on file.         Ac Sharp III      (Subject to voice recognition error, transcription service not allowed)           [1]   Current Outpatient Medications:     amLODIPine (NORVASC) 10 MG tablet, , Disp: , Rfl:     estradioL (ESTRACE) 0.01 % (0.1 mg/gram) vaginal cream, Place 1 g vaginally twice a week., Disp: 42.5 g, Rfl: 1    ibuprofen 200 mg Cap, Take 1 tablet by mouth 4 (four) times daily as needed., Disp: , Rfl:     labetaloL (NORMODYNE) 100 MG tablet, Take 100 mg by mouth., Disp: , Rfl:     pantoprazole (PROTONIX) 40 MG tablet, Take 40 mg by mouth., Disp: , Rfl:   [2]   Social History  Socioeconomic History    Marital status: Significant Other   Tobacco Use    Smoking status: Never   Substance and Sexual Activity    Alcohol use: Yes    Drug use: Not Currently    Sexual activity: Yes     Partners: Male     Birth control/protection: None

## 2025-03-19 ENCOUNTER — TELEPHONE (OUTPATIENT)
Dept: ORTHOPEDICS | Facility: CLINIC | Age: 50
End: 2025-03-19
Payer: OTHER GOVERNMENT

## 2025-03-25 ENCOUNTER — HOSPITAL ENCOUNTER (OUTPATIENT)
Dept: RADIOLOGY | Facility: HOSPITAL | Age: 50
Discharge: HOME | End: 2025-03-25
Attending: ORTHOPAEDIC SURGERY
Payer: OTHER GOVERNMENT

## 2025-03-25 DIAGNOSIS — M25.519 SHOULDER PAIN, UNSPECIFIED CHRONICITY, UNSPECIFIED LATERALITY: ICD-10-CM

## 2025-03-25 PROCEDURE — 73221 MRI JOINT UPR EXTREM W/O DYE: CPT | Mod: TC,LT

## 2025-03-25 PROCEDURE — 73221 MRI JOINT UPR EXTREM W/O DYE: CPT | Mod: 26,LT,, | Performed by: RADIOLOGY

## 2025-03-26 ENCOUNTER — RESULTS FOLLOW-UP (OUTPATIENT)
Dept: ORTHOPEDICS | Facility: HOSPITAL | Age: 50
End: 2025-03-26

## 2025-03-26 ENCOUNTER — HOSPITAL ENCOUNTER (OUTPATIENT)
Dept: RADIOLOGY | Facility: HOSPITAL | Age: 50
Discharge: HOME | End: 2025-03-26
Attending: OBSTETRICS & GYNECOLOGY
Payer: OTHER GOVERNMENT

## 2025-03-26 VITALS — HEIGHT: 67 IN | BODY MASS INDEX: 28.09 KG/M2 | WEIGHT: 179 LBS

## 2025-03-26 DIAGNOSIS — Z12.31 ENCOUNTER FOR SCREENING MAMMOGRAM FOR BREAST CANCER: ICD-10-CM

## 2025-04-03 ENCOUNTER — OFFICE VISIT (OUTPATIENT)
Dept: ORTHOPEDICS | Facility: CLINIC | Age: 50
End: 2025-04-03
Payer: OTHER GOVERNMENT

## 2025-04-03 DIAGNOSIS — Z01.812 PRE-OPERATIVE LABORATORY EXAMINATION: ICD-10-CM

## 2025-04-03 DIAGNOSIS — Z01.810 PRE-OPERATIVE CARDIOVASCULAR EXAMINATION: Primary | ICD-10-CM

## 2025-04-03 DIAGNOSIS — Z01.811 PRE-OPERATIVE RESPIRATORY EXAMINATION: ICD-10-CM

## 2025-04-03 DIAGNOSIS — M75.102 TEAR OF LEFT ROTATOR CUFF, UNSPECIFIED TEAR EXTENT, UNSPECIFIED WHETHER TRAUMATIC: ICD-10-CM

## 2025-04-03 PROCEDURE — 99213 OFFICE O/P EST LOW 20 MIN: CPT | Mod: PBBFAC | Performed by: ORTHOPAEDIC SURGERY

## 2025-04-03 PROCEDURE — 99214 OFFICE O/P EST MOD 30 MIN: CPT | Mod: S$PBB,,, | Performed by: ORTHOPAEDIC SURGERY

## 2025-04-03 PROCEDURE — 99999 PR PBB SHADOW E&M-EST. PATIENT-LVL III: CPT | Mod: PBBFAC,,, | Performed by: ORTHOPAEDIC SURGERY

## 2025-04-03 NOTE — H&P (VIEW-ONLY)
CC:   Chief Complaint   Patient presents with    Left Shoulder - Injury     RECHECK LEFT SHOULDER AFTER MRI         PREVIOUS INFO:  Right shoulder rotator cuff repair 1 bio corkscrew 2 push locks 02/01/2023  Right knee ACL 2013  Left knee ACL reconstruction 06/20/2012.  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      HISTORY:   4/3/2025    Rosa Maria Juan  is a 49 y.o. follow-up left shoulder pain that is progress over the last year despite conservative measures      PAST MEDICAL HISTORY:   Past Medical History:   Diagnosis Date    Hypertension     Postoperative nausea           PAST SURGICAL HISTORY:   Past Surgical History:   Procedure Laterality Date    ACROMIOPLASTY Right 02/01/2023    Procedure: ACROMIOPLASTY;  Surgeon: Ac Sharp III, MD;  Location: [facility] OR;  Service: Orthopedics;  Laterality: Right;    APPENDECTOMY  1994    KNEE ARTHROSCOPY W/ ACL RECONSTRUCTION Right 2013    ILM    KNEE ARTHROSCOPY W/ ACL RECONSTRUCTION AND PATELLA GRAFT Left 2012    ILM    KNEE ARTHROSCOPY W/ ACL RECONSTRUCTION AND PATELLA GRAFT Left 2015    ILM- ALLOGRAFT     REPAIR OF LABRUM OF HIP Right 02/01/2023    Procedure: REPAIR, ANTERIOR LABRUM,SHOULDER;  Surgeon: Ac Sharp III, MD;  Location: [facility] OR;  Service: Orthopedics;  Laterality: Right;    ROTATOR CUFF REPAIR Right 02/01/2023    Procedure: REPAIR, ROTATOR CUFF;  Surgeon: Ac Sharp III, MD;  Location: [facility] OR;  Service: Orthopedics;  Laterality: Right;    SHOULDER ARTHROSCOPY Right 02/01/2023    Procedure: ARTHROSCOPY, SHOULDER;  Surgeon: Ac Sharp III, MD;  Location: [facility] OR;  Service: Orthopedics;  Laterality: Right;          ALLERGIES:   Review of patient's allergies indicates:   Allergen Reactions    Lisinopril      Other reaction(s): Cough    Penicillins Edema        MEDICATIONS :  Current  Medications[1]     SOCIAL HISTORY: Social History[2]     ROS    FAMILY HISTORY:   Family History   Problem Relation Name Age of Onset    Hypertension Paternal Grandfather      Hypertension Paternal Grandmother      Cancer Maternal Grandmother      Cancer Maternal Grandfather      Diabetes Maternal Grandfather      Hypertension Father      Hypertension Mother      Diabetes Mother      Hypertension Brother      Hypertension Sister      No Known Problems Other            PHYSICAL EXAM: There were no vitals filed for this visit.            There is no height or weight on file to calculate BMI.     In general, this is a well-developed, well-nourished female . The patient is alert, oriented and cooperative.      HEENT:  Normocephalic, atraumatic.  Extraocular movements are intact bilaterally.  The oropharynx is benign.       NECK:  Nontender with good range of motion.      PULMONARY: Respirations are even and non-labored.       CARDIOVASCULAR: Pulses regular by peripheral palpation.       ABDOMEN:  Soft, non-tender, non-distended.        EXTREMITIES:  Left shouldern Timi's testing and external rotation testing this is left shoulder  Forward flexion is 90° with pain abduction 70° with the pain decreased strength on external external rotation testing and Timi's testing  Ortho Exam      RADIOGRAPHIC FINDINGS:     Left shoulder MRI April 2024 per my review it was almost a complete tear last year we were not involved in this  ILM  .   Impression:     Mild acromioclavicular joint osteoarthrosis.  Partial tearing supraspinatus tendon.  No other evidence of significant shoulder abnormality.        Electronically signed by:Serafin Russo  Date:                                            04/22/2024  Time:                                           16:43             MRI Shoulder Without Contrast Left: Result Notes     Ac Sharp III, MD  3/26/2025  3:45 PM CDT       Agree with a full-thickness tear  See back in the office  discussed probable surgery     Impression:     Full-thickness full width tear supraspinatus level of the footprint 12 x 9 mm.  Subacromial subdeltoid bursitis.  Associated tendinosis with undersurface fraying of infraspinatus.        Electronically signed by:Teo Perez MD  Date:                                            03/26/2025  Time:                                           04:24  .      IMPRESSION:  Left shoulder rotator cuff tear worsened over the last year her symptoms    PLAN:  Left shoulder arthroscopy open acromioplasty rotator cuff repair biceps work as indicated long rehab course discussed at length    I had a long discussion with the patient about treatment options, including operative and nonoperative treatments. We discussed pros and cons of each including risks pertinent to surgery including pain, infection, bleeding, damage to adjacent structures like nerves and blood vessels, failure to heal, need for future surgeries, stiffness, instability, loss of limb, anesthesia risks like stroke, blood clot, loss of life. We discussed the possibility of need for later hardware removal in the case that hardware was used. We discussed common and uncommon risks, and discussed patient specific factors that may increase the risks present with surgery. All questions were answered. The patient expressed understanding of the pros and cons of surgery and wanted to proceed with surgical treatment.             Ac Sharp III      (Subject to voice recognition error, transcription service not allowed)           [1]   Current Outpatient Medications:     amLODIPine (NORVASC) 10 MG tablet, , Disp: , Rfl:     estradioL (ESTRACE) 0.01 % (0.1 mg/gram) vaginal cream, Place 1 g vaginally twice a week., Disp: 42.5 g, Rfl: 1    ibuprofen 200 mg Cap, Take 1 tablet by mouth 4 (four) times daily as needed., Disp: , Rfl:     labetaloL (NORMODYNE) 100 MG tablet, Take 100 mg by mouth., Disp: , Rfl:     pantoprazole (PROTONIX)  40 MG tablet, Take 40 mg by mouth., Disp: , Rfl:   [2]   Social History  Socioeconomic History    Marital status: Significant Other   Tobacco Use    Smoking status: Never   Substance and Sexual Activity    Alcohol use: Yes    Drug use: Not Currently    Sexual activity: Yes     Partners: Male     Birth control/protection: None

## 2025-04-03 NOTE — PROGRESS NOTES
CC:   Chief Complaint   Patient presents with    Left Shoulder - Injury     RECHECK LEFT SHOULDER AFTER MRI         PREVIOUS INFO:  Right shoulder rotator cuff repair 1 bio corkscrew 2 push locks 02/01/2023  Right knee ACL 2013  Left knee ACL reconstruction 06/20/2012.  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      HISTORY:   4/3/2025    Rosa Maria Juan  is a 49 y.o. follow-up left shoulder pain that is progress over the last year despite conservative measures      PAST MEDICAL HISTORY:   Past Medical History:   Diagnosis Date    Hypertension     Postoperative nausea           PAST SURGICAL HISTORY:   Past Surgical History:   Procedure Laterality Date    ACROMIOPLASTY Right 02/01/2023    Procedure: ACROMIOPLASTY;  Surgeon: Ac Sharp III, MD;  Location: [facility] OR;  Service: Orthopedics;  Laterality: Right;    APPENDECTOMY  1994    KNEE ARTHROSCOPY W/ ACL RECONSTRUCTION Right 2013    ILM    KNEE ARTHROSCOPY W/ ACL RECONSTRUCTION AND PATELLA GRAFT Left 2012    ILM    KNEE ARTHROSCOPY W/ ACL RECONSTRUCTION AND PATELLA GRAFT Left 2015    ILM- ALLOGRAFT     REPAIR OF LABRUM OF HIP Right 02/01/2023    Procedure: REPAIR, ANTERIOR LABRUM,SHOULDER;  Surgeon: Ac Sharp III, MD;  Location: [facility] OR;  Service: Orthopedics;  Laterality: Right;    ROTATOR CUFF REPAIR Right 02/01/2023    Procedure: REPAIR, ROTATOR CUFF;  Surgeon: Ac Sharp III, MD;  Location: [facility] OR;  Service: Orthopedics;  Laterality: Right;    SHOULDER ARTHROSCOPY Right 02/01/2023    Procedure: ARTHROSCOPY, SHOULDER;  Surgeon: Ac Sharp III, MD;  Location: [facility] OR;  Service: Orthopedics;  Laterality: Right;          ALLERGIES:   Review of patient's allergies indicates:   Allergen Reactions    Lisinopril      Other reaction(s): Cough    Penicillins Edema        MEDICATIONS :  Current  Medications[1]     SOCIAL HISTORY: Social History[2]     ROS    FAMILY HISTORY:   Family History   Problem Relation Name Age of Onset    Hypertension Paternal Grandfather      Hypertension Paternal Grandmother      Cancer Maternal Grandmother      Cancer Maternal Grandfather      Diabetes Maternal Grandfather      Hypertension Father      Hypertension Mother      Diabetes Mother      Hypertension Brother      Hypertension Sister      No Known Problems Other            PHYSICAL EXAM: There were no vitals filed for this visit.            There is no height or weight on file to calculate BMI.     In general, this is a well-developed, well-nourished female . The patient is alert, oriented and cooperative.      HEENT:  Normocephalic, atraumatic.  Extraocular movements are intact bilaterally.  The oropharynx is benign.       NECK:  Nontender with good range of motion.      PULMONARY: Respirations are even and non-labored.       CARDIOVASCULAR: Pulses regular by peripheral palpation.       ABDOMEN:  Soft, non-tender, non-distended.        EXTREMITIES:  Left shouldern Timi's testing and external rotation testing this is left shoulder  Forward flexion is 90° with pain abduction 70° with the pain decreased strength on external external rotation testing and Timi's testing  Ortho Exam      RADIOGRAPHIC FINDINGS:     Left shoulder MRI April 2024 per my review it was almost a complete tear last year we were not involved in this  ILM  .   Impression:     Mild acromioclavicular joint osteoarthrosis.  Partial tearing supraspinatus tendon.  No other evidence of significant shoulder abnormality.        Electronically signed by:Serafin Russo  Date:                                            04/22/2024  Time:                                           16:43             MRI Shoulder Without Contrast Left: Result Notes     Ac Sharp III, MD  3/26/2025  3:45 PM CDT       Agree with a full-thickness tear  See back in the office  discussed probable surgery     Impression:     Full-thickness full width tear supraspinatus level of the footprint 12 x 9 mm.  Subacromial subdeltoid bursitis.  Associated tendinosis with undersurface fraying of infraspinatus.        Electronically signed by:Teo Perez MD  Date:                                            03/26/2025  Time:                                           04:24  .      IMPRESSION:  Left shoulder rotator cuff tear worsened over the last year her symptoms    PLAN:  Left shoulder arthroscopy open acromioplasty rotator cuff repair biceps work as indicated long rehab course discussed at length    I had a long discussion with the patient about treatment options, including operative and nonoperative treatments. We discussed pros and cons of each including risks pertinent to surgery including pain, infection, bleeding, damage to adjacent structures like nerves and blood vessels, failure to heal, need for future surgeries, stiffness, instability, loss of limb, anesthesia risks like stroke, blood clot, loss of life. We discussed the possibility of need for later hardware removal in the case that hardware was used. We discussed common and uncommon risks, and discussed patient specific factors that may increase the risks present with surgery. All questions were answered. The patient expressed understanding of the pros and cons of surgery and wanted to proceed with surgical treatment.             Ac Sharp III      (Subject to voice recognition error, transcription service not allowed)           [1]   Current Outpatient Medications:     amLODIPine (NORVASC) 10 MG tablet, , Disp: , Rfl:     estradioL (ESTRACE) 0.01 % (0.1 mg/gram) vaginal cream, Place 1 g vaginally twice a week., Disp: 42.5 g, Rfl: 1    ibuprofen 200 mg Cap, Take 1 tablet by mouth 4 (four) times daily as needed., Disp: , Rfl:     labetaloL (NORMODYNE) 100 MG tablet, Take 100 mg by mouth., Disp: , Rfl:     pantoprazole (PROTONIX)  40 MG tablet, Take 40 mg by mouth., Disp: , Rfl:   [2]   Social History  Socioeconomic History    Marital status: Significant Other   Tobacco Use    Smoking status: Never   Substance and Sexual Activity    Alcohol use: Yes    Drug use: Not Currently    Sexual activity: Yes     Partners: Male     Birth control/protection: None

## 2025-04-03 NOTE — PATIENT INSTRUCTIONS
Surgery Instructions     Your surgery is scheduled for 25 at [facility] on the   ground floor of the Ambulatory building. You should arrive at 8am at the   Ambulatory Care Center located at [address].    Pre Op Testin/10-    __x__ Lab  (1st floor clinic)   __x__ EKG  (2nd floor clinic)  ___x_ Chest xray (Imaging Center)     Pre Op Clearance:     Our office will contact you the day before surgery with your arrival time.  DO NOT eat or drink anything after midnight the night before surgery (this includes gum, candy, chewing tobacco, etc).  You CAN NOT drive after surgery, please arrange for someone to drive you.  Bring all medication in their original bottles.  Bathe with Hibiclens the night or morning before your surgery.  The morning of your surgery ONLY take blood pressure, heart, acid reflux, or thyroid (if you take a morning dose) medication with a sip of water.   Be sure to have stopped your blood thinner medication at the appropriate time, as instructed.  Bring your C-Pap machine if you have one.  All jewelry, piercings, or false eyelashes MUST be removed prior to surgery.

## 2025-04-09 ENCOUNTER — TELEPHONE (OUTPATIENT)
Dept: ORTHOPEDICS | Facility: CLINIC | Age: 50
End: 2025-04-09
Payer: OTHER GOVERNMENT

## 2025-04-09 NOTE — TELEPHONE ENCOUNTER
----- Message from Ramiro sent at 4/9/2025 10:44 AM CDT -----  Regarding: Pt call back  Who Called: Rosa Maria Bermudez would like someone in the office to give her a call about surgery she has coming up she has [facility] and they have some things they want done to cover the surgeryPreferred Method of Contact: Phone CallPatient's Preferred Phone Number on File: [phone]

## 2025-04-09 NOTE — LETTER
April 9, 2025      [facility]  [address] [place] [id number]  Phone: [phone]  Fax: [phone]       Patient: Rosa Maria Juan   YOB: 1975    To Whom It May Concern:    Rosa Maria Juan is scheduled for left shoulder surgery in 4/21/25 and will be out of work for 6-10 weeks. She would greatly benefit from the use of a cryo-cuff ice machine for recovery after her surgery. If you have any questions or concerns, or if I can be of further assistance, please do not hesitate to contact me.    Sincerely,    Jennifer Sharp III, M.D.

## 2025-04-10 ENCOUNTER — HOSPITAL ENCOUNTER (OUTPATIENT)
Dept: RADIOLOGY | Facility: HOSPITAL | Age: 50
Discharge: HOME | End: 2025-04-10
Attending: ORTHOPAEDIC SURGERY
Payer: OTHER GOVERNMENT

## 2025-04-10 ENCOUNTER — CLINICAL SUPPORT (OUTPATIENT)
Dept: CARDIOLOGY | Facility: CLINIC | Age: 50
End: 2025-04-10
Payer: OTHER GOVERNMENT

## 2025-04-10 DIAGNOSIS — Z01.810 PRE-OPERATIVE CARDIOVASCULAR EXAMINATION: ICD-10-CM

## 2025-04-10 DIAGNOSIS — Z01.811 PRE-OPERATIVE RESPIRATORY EXAMINATION: ICD-10-CM

## 2025-04-10 PROCEDURE — 93010 ELECTROCARDIOGRAM REPORT: CPT | Mod: S$PBB,,, | Performed by: STUDENT IN AN ORGANIZED HEALTH CARE EDUCATION/TRAINING PROGRAM

## 2025-04-10 PROCEDURE — 99999 PR PBB SHADOW E&M-EST. PATIENT-LVL I: CPT | Mod: PBBFAC,,,

## 2025-04-10 PROCEDURE — 93005 ELECTROCARDIOGRAM TRACING: CPT | Mod: PBBFAC | Performed by: STUDENT IN AN ORGANIZED HEALTH CARE EDUCATION/TRAINING PROGRAM

## 2025-04-10 PROCEDURE — 71046 X-RAY EXAM CHEST 2 VIEWS: CPT | Mod: TC

## 2025-04-10 PROCEDURE — 99211 OFF/OP EST MAY X REQ PHY/QHP: CPT | Mod: PBBFAC,25

## 2025-04-10 PROCEDURE — 71046 X-RAY EXAM CHEST 2 VIEWS: CPT | Mod: 26,,, | Performed by: RADIOLOGY

## 2025-04-11 LAB
OHS QRS DURATION: 80 MS
OHS QTC CALCULATION: 416 MS

## 2025-04-21 ENCOUNTER — HOSPITAL ENCOUNTER (OUTPATIENT)
Facility: HOSPITAL | Age: 50
Discharge: HOME | End: 2025-04-21
Attending: ORTHOPAEDIC SURGERY | Admitting: ORTHOPAEDIC SURGERY
Payer: OTHER GOVERNMENT

## 2025-04-21 ENCOUNTER — ANESTHESIA EVENT (OUTPATIENT)
Dept: SURGERY | Facility: HOSPITAL | Age: 50
End: 2025-04-21
Payer: OTHER GOVERNMENT

## 2025-04-21 ENCOUNTER — ANESTHESIA (OUTPATIENT)
Dept: SURGERY | Facility: HOSPITAL | Age: 50
End: 2025-04-21
Payer: OTHER GOVERNMENT

## 2025-04-21 VITALS
BODY MASS INDEX: 27 KG/M2 | DIASTOLIC BLOOD PRESSURE: 80 MMHG | OXYGEN SATURATION: 97 % | HEART RATE: 64 BPM | HEIGHT: 67 IN | RESPIRATION RATE: 18 BRPM | SYSTOLIC BLOOD PRESSURE: 127 MMHG | WEIGHT: 172 LBS | TEMPERATURE: 99 F

## 2025-04-21 DIAGNOSIS — S46.012D TRAUMATIC COMPLETE TEAR OF LEFT ROTATOR CUFF, SUBSEQUENT ENCOUNTER: Primary | ICD-10-CM

## 2025-04-21 DIAGNOSIS — M75.100 ROTATOR CUFF TEAR: ICD-10-CM

## 2025-04-21 PROBLEM — M75.102 TEAR OF LEFT ROTATOR CUFF: Status: ACTIVE | Noted: 2025-04-21

## 2025-04-21 LAB
B-HCG UR QL: NEGATIVE
CTP QC/QA: YES

## 2025-04-21 PROCEDURE — 71000033 HC RECOVERY, INTIAL HOUR: Performed by: ORTHOPAEDIC SURGERY

## 2025-04-21 PROCEDURE — 37000008 HC ANESTHESIA 1ST 15 MINUTES: Performed by: ORTHOPAEDIC SURGERY

## 2025-04-21 PROCEDURE — 25000003 PHARM REV CODE 250: Performed by: NURSE ANESTHETIST, CERTIFIED REGISTERED

## 2025-04-21 PROCEDURE — 27000716 HC OXISENSOR PROBE, ANY SIZE: Performed by: NURSE ANESTHETIST, CERTIFIED REGISTERED

## 2025-04-21 PROCEDURE — 23412 REPAIR ROTATOR CUFF CHRONIC: CPT | Mod: LT,,, | Performed by: ORTHOPAEDIC SURGERY

## 2025-04-21 PROCEDURE — D9220A PRA ANESTHESIA: Mod: ANES,,, | Performed by: ANESTHESIOLOGY

## 2025-04-21 PROCEDURE — 63600175 PHARM REV CODE 636 W HCPCS: Performed by: ANESTHESIOLOGY

## 2025-04-21 PROCEDURE — 37000009 HC ANESTHESIA EA ADD 15 MINS: Performed by: ORTHOPAEDIC SURGERY

## 2025-04-21 PROCEDURE — C1713 ANCHOR/SCREW BN/BN,TIS/BN: HCPCS | Performed by: ORTHOPAEDIC SURGERY

## 2025-04-21 PROCEDURE — 25000003 PHARM REV CODE 250: Performed by: ANESTHESIOLOGY

## 2025-04-21 PROCEDURE — 27000510 HC BLANKET BAIR HUGGER ANY SIZE: Performed by: NURSE ANESTHETIST, CERTIFIED REGISTERED

## 2025-04-21 PROCEDURE — 27000450 HC CEREBRAL OXIMETER PROBE: Performed by: NURSE ANESTHETIST, CERTIFIED REGISTERED

## 2025-04-21 PROCEDURE — C1769 GUIDE WIRE: HCPCS | Performed by: ORTHOPAEDIC SURGERY

## 2025-04-21 PROCEDURE — 27000689 HC BLADE LARYNGOSCOPE ANY SIZE: Performed by: NURSE ANESTHETIST, CERTIFIED REGISTERED

## 2025-04-21 PROCEDURE — 27200750 HC INSULATED NEEDLE/ STIMUPLEX: Performed by: NURSE ANESTHETIST, CERTIFIED REGISTERED

## 2025-04-21 PROCEDURE — 97161 PT EVAL LOW COMPLEX 20 MIN: CPT

## 2025-04-21 PROCEDURE — 71000015 HC POSTOP RECOV 1ST HR: Performed by: ORTHOPAEDIC SURGERY

## 2025-04-21 PROCEDURE — 71000016 HC POSTOP RECOV ADDL HR: Performed by: ORTHOPAEDIC SURGERY

## 2025-04-21 PROCEDURE — 25000003 PHARM REV CODE 250: Performed by: ORTHOPAEDIC SURGERY

## 2025-04-21 PROCEDURE — 23430 REPAIR BICEPS TENDON: CPT | Mod: 51,LT,, | Performed by: ORTHOPAEDIC SURGERY

## 2025-04-21 PROCEDURE — 36000711: Performed by: ORTHOPAEDIC SURGERY

## 2025-04-21 PROCEDURE — 27202344 HC EYESHIELD: Performed by: NURSE ANESTHETIST, CERTIFIED REGISTERED

## 2025-04-21 PROCEDURE — 63600175 PHARM REV CODE 636 W HCPCS: Performed by: NURSE ANESTHETIST, CERTIFIED REGISTERED

## 2025-04-21 PROCEDURE — 27000165 HC TUBE, ETT CUFFED: Performed by: NURSE ANESTHETIST, CERTIFIED REGISTERED

## 2025-04-21 PROCEDURE — 81025 URINE PREGNANCY TEST: CPT | Performed by: ORTHOPAEDIC SURGERY

## 2025-04-21 PROCEDURE — 36000710: Performed by: ORTHOPAEDIC SURGERY

## 2025-04-21 PROCEDURE — 27201423 OPTIME MED/SURG SUP & DEVICES STERILE SUPPLY: Performed by: ORTHOPAEDIC SURGERY

## 2025-04-21 PROCEDURE — 64415 NJX AA&/STRD BRCH PLXS IMG: CPT | Mod: 59,LT,, | Performed by: ANESTHESIOLOGY

## 2025-04-21 PROCEDURE — 29822 SHO ARTHRS SRG LMTD DBRDMT: CPT | Mod: 51,LT,, | Performed by: ORTHOPAEDIC SURGERY

## 2025-04-21 PROCEDURE — D9220A PRA ANESTHESIA: Mod: CRNA,,, | Performed by: NURSE ANESTHETIST, CERTIFIED REGISTERED

## 2025-04-21 DEVICE — HYBRID FIBERTAK WITH NEEDLES, KNEE
Type: IMPLANTABLE DEVICE | Site: SHOULDER | Status: FUNCTIONAL
Brand: ARTHREX®

## 2025-04-21 DEVICE — SUTURE ANCHOR, BIO- COMPOSITE PUSHLOCK
Type: IMPLANTABLE DEVICE | Site: SHOULDER | Status: FUNCTIONAL
Brand: ARTHREX®

## 2025-04-21 DEVICE — BIO-COMP CRKSCRW FT VENT 5.5X14.7MM
Type: IMPLANTABLE DEVICE | Site: SHOULDER | Status: FUNCTIONAL
Brand: ARTHREX®

## 2025-04-21 RX ORDER — MEPERIDINE HYDROCHLORIDE 25 MG/ML
25 INJECTION INTRAMUSCULAR; INTRAVENOUS; SUBCUTANEOUS EVERY 10 MIN PRN
Status: DISCONTINUED | OUTPATIENT
Start: 2025-04-21 | End: 2025-04-21 | Stop reason: HOSPADM

## 2025-04-21 RX ORDER — ONDANSETRON HYDROCHLORIDE 2 MG/ML
INJECTION, SOLUTION INTRAVENOUS
Status: DISCONTINUED | OUTPATIENT
Start: 2025-04-21 | End: 2025-04-21

## 2025-04-21 RX ORDER — MIDAZOLAM HYDROCHLORIDE 1 MG/ML
INJECTION INTRAMUSCULAR; INTRAVENOUS
Status: DISCONTINUED | OUTPATIENT
Start: 2025-04-21 | End: 2025-04-21

## 2025-04-21 RX ORDER — EPHEDRINE SULFATE 50 MG/ML
INJECTION, SOLUTION INTRAVENOUS
Status: DISCONTINUED | OUTPATIENT
Start: 2025-04-21 | End: 2025-04-21

## 2025-04-21 RX ORDER — SODIUM CHLORIDE 9 MG/ML
INJECTION, SOLUTION INTRAVENOUS CONTINUOUS
Status: DISCONTINUED | OUTPATIENT
Start: 2025-04-21 | End: 2025-04-21 | Stop reason: HOSPADM

## 2025-04-21 RX ORDER — HYDROCODONE BITARTRATE AND ACETAMINOPHEN 5; 325 MG/1; MG/1
1 TABLET ORAL EVERY 4 HOURS PRN
Status: DISCONTINUED | OUTPATIENT
Start: 2025-04-21 | End: 2025-04-21 | Stop reason: HOSPADM

## 2025-04-21 RX ORDER — LIDOCAINE HYDROCHLORIDE 20 MG/ML
INJECTION, SOLUTION EPIDURAL; INFILTRATION; INTRACAUDAL; PERINEURAL
Status: DISCONTINUED | OUTPATIENT
Start: 2025-04-21 | End: 2025-04-21

## 2025-04-21 RX ORDER — DIMENHYDRINATE 50 MG
50 TABLET ORAL ONCE
Status: COMPLETED | OUTPATIENT
Start: 2025-04-21 | End: 2025-04-21

## 2025-04-21 RX ORDER — ROPIVACAINE HYDROCHLORIDE 7.5 MG/ML
INJECTION, SOLUTION EPIDURAL; PERINEURAL
Status: COMPLETED | OUTPATIENT
Start: 2025-04-21 | End: 2025-04-21

## 2025-04-21 RX ORDER — KETOROLAC TROMETHAMINE 10 MG/1
10 TABLET, FILM COATED ORAL EVERY 6 HOURS
Qty: 20 TABLET | Refills: 0 | Status: SHIPPED | OUTPATIENT
Start: 2025-04-21 | End: 2025-04-26

## 2025-04-21 RX ORDER — PROPOFOL 10 MG/ML
VIAL (ML) INTRAVENOUS
Status: DISCONTINUED | OUTPATIENT
Start: 2025-04-21 | End: 2025-04-21

## 2025-04-21 RX ORDER — MORPHINE SULFATE 10 MG/ML
4 INJECTION INTRAMUSCULAR; INTRAVENOUS; SUBCUTANEOUS EVERY 5 MIN PRN
Status: DISCONTINUED | OUTPATIENT
Start: 2025-04-21 | End: 2025-04-21 | Stop reason: HOSPADM

## 2025-04-21 RX ORDER — HYDROMORPHONE HYDROCHLORIDE 2 MG/ML
0.5 INJECTION, SOLUTION INTRAMUSCULAR; INTRAVENOUS; SUBCUTANEOUS EVERY 5 MIN PRN
Status: DISCONTINUED | OUTPATIENT
Start: 2025-04-21 | End: 2025-04-21 | Stop reason: HOSPADM

## 2025-04-21 RX ORDER — DIPHENHYDRAMINE HYDROCHLORIDE 50 MG/ML
25 INJECTION, SOLUTION INTRAMUSCULAR; INTRAVENOUS EVERY 6 HOURS PRN
Status: DISCONTINUED | OUTPATIENT
Start: 2025-04-21 | End: 2025-04-21 | Stop reason: HOSPADM

## 2025-04-21 RX ORDER — CLINDAMYCIN PHOSPHATE 900 MG/50ML
INJECTION, SOLUTION INTRAVENOUS
Status: DISCONTINUED | OUTPATIENT
Start: 2025-04-21 | End: 2025-04-21

## 2025-04-21 RX ORDER — GLYCOPYRROLATE 0.2 MG/ML
INJECTION INTRAMUSCULAR; INTRAVENOUS
Status: DISCONTINUED | OUTPATIENT
Start: 2025-04-21 | End: 2025-04-21

## 2025-04-21 RX ORDER — HYDROCODONE BITARTRATE AND ACETAMINOPHEN 7.5; 325 MG/1; MG/1
1 TABLET ORAL EVERY 6 HOURS PRN
Qty: 20 TABLET | Refills: 0 | Status: SHIPPED | OUTPATIENT
Start: 2025-04-21

## 2025-04-21 RX ORDER — DEXAMETHASONE SODIUM PHOSPHATE 4 MG/ML
INJECTION, SOLUTION INTRA-ARTICULAR; INTRALESIONAL; INTRAMUSCULAR; INTRAVENOUS; SOFT TISSUE
Status: DISCONTINUED | OUTPATIENT
Start: 2025-04-21 | End: 2025-04-21

## 2025-04-21 RX ORDER — FENTANYL CITRATE 50 UG/ML
INJECTION, SOLUTION INTRAMUSCULAR; INTRAVENOUS
Status: DISCONTINUED | OUTPATIENT
Start: 2025-04-21 | End: 2025-04-21

## 2025-04-21 RX ORDER — IPRATROPIUM BROMIDE AND ALBUTEROL SULFATE 2.5; .5 MG/3ML; MG/3ML
3 SOLUTION RESPIRATORY (INHALATION) ONCE
Status: DISCONTINUED | OUTPATIENT
Start: 2025-04-21 | End: 2025-04-21 | Stop reason: HOSPADM

## 2025-04-21 RX ORDER — METAXALONE 800 MG/1
800 TABLET ORAL 3 TIMES DAILY PRN
Qty: 20 TABLET | Refills: 0 | Status: SHIPPED | OUTPATIENT
Start: 2025-04-21 | End: 2025-05-01

## 2025-04-21 RX ORDER — ROCURONIUM BROMIDE 10 MG/ML
INJECTION, SOLUTION INTRAVENOUS
Status: DISCONTINUED | OUTPATIENT
Start: 2025-04-21 | End: 2025-04-21

## 2025-04-21 RX ORDER — ONDANSETRON HYDROCHLORIDE 2 MG/ML
4 INJECTION, SOLUTION INTRAVENOUS DAILY PRN
Status: DISCONTINUED | OUTPATIENT
Start: 2025-04-21 | End: 2025-04-21 | Stop reason: HOSPADM

## 2025-04-21 RX ORDER — ACETAMINOPHEN 10 MG/ML
1000 INJECTION, SOLUTION INTRAVENOUS ONCE
Status: DISCONTINUED | OUTPATIENT
Start: 2025-04-21 | End: 2025-04-21 | Stop reason: HOSPADM

## 2025-04-21 RX ORDER — ONDANSETRON 4 MG/1
4 TABLET, FILM COATED ORAL ONCE
Status: COMPLETED | OUTPATIENT
Start: 2025-04-21 | End: 2025-04-21

## 2025-04-21 RX ORDER — ONDANSETRON 4 MG/1
8 TABLET, ORALLY DISINTEGRATING ORAL EVERY 8 HOURS PRN
Status: DISCONTINUED | OUTPATIENT
Start: 2025-04-21 | End: 2025-04-21 | Stop reason: HOSPADM

## 2025-04-21 RX ORDER — MORPHINE SULFATE 4 MG/ML
4 INJECTION, SOLUTION INTRAMUSCULAR; INTRAVENOUS EVERY 4 HOURS PRN
Status: DISCONTINUED | OUTPATIENT
Start: 2025-04-21 | End: 2025-04-21 | Stop reason: HOSPADM

## 2025-04-21 RX ADMIN — PROPOFOL 200 MG: 10 INJECTION, EMULSION INTRAVENOUS at 11:04

## 2025-04-21 RX ADMIN — ROPIVACAINE HYDROCHLORIDE 40 ML: 7.5 INJECTION, SOLUTION EPIDURAL; PERINEURAL at 10:04

## 2025-04-21 RX ADMIN — GLYCOPYRROLATE 0.2 MG: 0.2 INJECTION INTRAMUSCULAR; INTRAVENOUS at 10:04

## 2025-04-21 RX ADMIN — ONDANSETRON 8 MG: 2 INJECTION INTRAMUSCULAR; INTRAVENOUS at 11:04

## 2025-04-21 RX ADMIN — CLINDAMYCIN IN 5 PERCENT DEXTROSE 900 MG: 18 INJECTION, SOLUTION INTRAVENOUS at 11:04

## 2025-04-21 RX ADMIN — DEXAMETHASONE SODIUM PHOSPHATE 8 MG: 4 INJECTION, SOLUTION INTRA-ARTICULAR; INTRALESIONAL; INTRAMUSCULAR; INTRAVENOUS; SOFT TISSUE at 11:04

## 2025-04-21 RX ADMIN — EPHEDRINE SULFATE 25 MG: 50 INJECTION INTRAVENOUS at 11:04

## 2025-04-21 RX ADMIN — ONDANSETRON HYDROCHLORIDE 4 MG: 4 TABLET, FILM COATED ORAL at 08:04

## 2025-04-21 RX ADMIN — SODIUM CHLORIDE: 9 INJECTION, SOLUTION INTRAVENOUS at 08:04

## 2025-04-21 RX ADMIN — SUGAMMADEX 200 MG: 100 INJECTION, SOLUTION INTRAVENOUS at 12:04

## 2025-04-21 RX ADMIN — FENTANYL CITRATE 100 MCG: 50 INJECTION INTRAMUSCULAR; INTRAVENOUS at 11:04

## 2025-04-21 RX ADMIN — DIMENHYDRINATE 50 MG: 50 TABLET ORAL at 08:04

## 2025-04-21 RX ADMIN — LIDOCAINE HYDROCHLORIDE 100 MG: 20 INJECTION, SOLUTION INTRAVENOUS at 11:04

## 2025-04-21 RX ADMIN — SODIUM CHLORIDE: 9 INJECTION, SOLUTION INTRAVENOUS at 11:04

## 2025-04-21 RX ADMIN — ROCURONIUM BROMIDE 50 MG: 10 INJECTION, SOLUTION INTRAVENOUS at 11:04

## 2025-04-21 RX ADMIN — MIDAZOLAM HYDROCHLORIDE 2 MG: 1 INJECTION, SOLUTION INTRAMUSCULAR; INTRAVENOUS at 10:04

## 2025-04-21 NOTE — TRANSFER OF CARE
"Anesthesia Transfer of Care Note    Patient: Rosa Maria Juan    Procedure(s) Performed: Procedure(s) (LRB):  ARTHROSCOPY, SHOULDER, W/ DEBRIDEMENT, ROTATOR CUFF,BICEPS (Left)  REPAIR, ROTATOR CUFF (Left)  TENODESIS, BICEPS, OPEN (Left)  ACROMIOPLASTY (Left)    Patient location: PACU    Anesthesia Type: general    Transport from OR: Transported from OR on 100% O2 by closed face mask with adequate spontaneous ventilation    Post pain: adequate analgesia    Post assessment: no apparent anesthetic complications    Post vital signs: stable    Level of consciousness: responds to stimulation    Nausea/Vomiting: no nausea/vomiting    Complications: none    Transfer of care protocol was followed      Last vitals: Visit Vitals  /63 (BP Location: Right arm, Patient Position: Lying)   Pulse 68   Temp 37 °C (98.6 °F) (Oral)   Resp 18   Ht 5' 7" (1.702 m)   Wt 78 kg (172 lb)   LMP 04/20/2025 (Exact Date)   SpO2 100%   Breastfeeding No   BMI 26.94 kg/m²     "

## 2025-04-21 NOTE — ANESTHESIA PROCEDURE NOTES
Intubation    Date/Time: 4/21/2025 11:02 AM    Performed by: Óscar Turner CRNA  Authorized by: Artem Haynes MD    Intubation:     Induction:  Intravenous    Intubated:  Postinduction    Mask Ventilation:  Easy mask    Attempts:  1    Attempted By:  CRNA    Method of Intubation:  Direct    Blade:  Jaime 3    Laryngeal View Grade: Grade I - full view of cords      Difficult Airway Encountered?: No      Complications:  None    Airway Device:  Oral endotracheal tube    Airway Device Size:  7.0    Style/Cuff Inflation:  Cuffed    Inflation Amount (mL):  7    Tube secured:  22    Secured at:  The lips    Placement Verified By:  Capnometry    Complicating Factors:  None    Findings Post-Intubation:  BS equal bilateral and atraumatic/condition of teeth unchanged

## 2025-04-21 NOTE — ANESTHESIA PREPROCEDURE EVALUATION
04/21/2025  Rosa Maria Juan is a 49 y.o., female.      Pre-op Assessment    I have reviewed the Patient Summary Reports.     I have reviewed the Nursing Notes. I have reviewed the NPO Status.   I have reviewed the Medications.     Review of Systems  Anesthesia Hx:  No problems with previous Anesthesia              Personal Hx of Anesthesia complications, Post-Operative Nausea/Vomiting                    Social:  Non-Smoker, No Alcohol Use       Hematology/Oncology:  Hematology Normal   Oncology Normal                                   EENT/Dental:  EENT/Dental Normal           Cardiovascular:     Hypertension                                          Pulmonary:  Pulmonary Normal                       Renal/:  Renal/ Normal                 Hepatic/GI:     GERD                Musculoskeletal:  Musculoskeletal Normal                Neurological:  Neurology Normal                                      Endocrine:  Endocrine Normal            Dermatological:  Skin Normal    Psych:  Psychiatric Normal                    Physical Exam  General: Well nourished    Airway:  Mallampati: II / II  Mouth Opening: Normal  TM Distance: > 6 cm  Tongue: Normal  Neck ROM: Normal ROM    Chest/Lungs:  Clear to auscultation, Normal Respiratory Rate    Heart:  Rate: Normal  Rhythm: Regular Rhythm        Chemistry        Component Value Date/Time     04/10/2025 1300    K 3.8 04/10/2025 1300     04/10/2025 1300    CO2 27 04/10/2025 1300    BUN 20 (H) 04/10/2025 1300    CREATININE 0.82 04/10/2025 1300    GLU 91 04/10/2025 1300        Component Value Date/Time    CALCIUM 9.4 04/10/2025 1300    ALKPHOS 60 04/10/2025 1300    AST 25 04/10/2025 1300    ALT 27 04/10/2025 1300    BILITOT 0.4 04/10/2025 1300        Lab Results   Component Value Date    WBC 6.65 04/10/2025    HGB 13.7 04/10/2025    HCT 40.0 04/10/2025      04/10/2025     Results for orders placed or performed in visit on 04/10/25   EKG 12-lead    Collection Time: 04/10/25  1:16 PM   Result Value Ref Range    QRS Duration 80 ms    OHS QTC Calculation 416 ms    Narrative    Test Reason : Z01.810,    Vent. Rate :  57 BPM     Atrial Rate :  57 BPM     P-R Int : 162 ms          QRS Dur :  80 ms      QT Int : 428 ms       P-R-T Axes :  32  67  62 degrees    QTcB Int : 416 ms    Sinus bradycardia  Otherwise normal ECG  When compared with ECG of 05-Jan-2023 13:29,  No significant change was found  Confirmed by Vincenzo Tapia ([id number]) on 4/11/2025 8:14:45 AM    Referred By: KAT MORE           Confirmed By: Vincenzo Tapia         Anesthesia Plan  Type of Anesthesia, risks & benefits discussed:    Anesthesia Type: Gen ETT, Regional  Intra-op Monitoring Plan: Standard ASA Monitors  Post Op Pain Control Plan: multimodal analgesia  Induction:  IV  Informed Consent: Informed consent signed with the Patient and all parties understand the risks and agree with anesthesia plan.  All questions answered.   ASA Score: 2  Day of Surgery Review of History & Physical: H&P Update referred to the surgeon/provider.I have interviewed and examined the patient. I have reviewed the patient's H&P dated: There are no significant changes. H&P completed by Anesthesiologist.    Ready For Surgery From Anesthesia Perspective.     .

## 2025-04-21 NOTE — OR NURSING
1254- Patient arrived in pacu. Vss, respirations even et unlabored.Dressing on left shoulder clean, dry and intact. No signs of pain or distress. Radial pulse palpable on left. Black arm sling with abduction pillow on left arm.     1300- Pt more awake. Vss, respirations even et unlabored. No signs of pain or distress. Dressing on left shoulder c/d/I.. Pt unable to move fingers on left hand. Denies pain.     1320- Pt aaox4. Denies pain or nausea. Vss, respirations even et unlabored. Dressing on left shoulder c/d/I. Pt unable to move fingers on left hand. Pt chatting with nursing staff.    1336- Pt ready for discharge per pacu criteria. Pt aaox4. Dressing on left shouder c/d/I. No signs of bleeding or drainage. Pt denies pain or nausea.the patient transported to asc room 20 by stretcher.    1340- Report given to Shruthi Rodrigues LPN. VS: 122/89, hr 80, rr 14, o2 95%. Family at bedside.

## 2025-04-21 NOTE — ANESTHESIA PROCEDURE NOTES
Peripheral Block    Patient location during procedure: OR   Block not for primary anesthetic.  Reason for block: at surgeon's request and post-op pain management   Post-op Pain Location: Right shoulder   Start time: 4/21/2025 10:53 AM  Timeout: 4/21/2025 10:52 AM   End time: 4/21/2025 10:57 AM    Staffing  Authorizing Provider: Artem Haynes MD  Performing Provider: Rocco Alba MD    Staffing  Performed by: Rocco Alba MD  Authorized by: Artem Haynes MD    Preanesthetic Checklist  Completed: patient identified, risks and benefits discussed, pre-op evaluation and timeout performed  Peripheral Block  Patient position: supine  Prep: ChloraPrep  Block type: interscalene  Laterality: left  Injection technique: single shot  Needle  Needle localization: ultrasound guidance and nerve stimulator     Assessment  Injection assessment: negative aspiration    Medications:    Medications: ROPIvacaine (NAROPIN) injection 0.75% - Perineural   40 mL - 4/21/2025 10:57:00 AM    Additional Notes  No complications.

## 2025-04-21 NOTE — BRIEF OP NOTE
[facility]  Brief Operative Note     Surgery Date: 4/21/2025     PREOPERATIVE DIAGNOSIS:  Left shoulder rotator cuff tear    POSTOPERATIVE DIAGNOSIS:  Left shoulder rotator cuff tear complete  Biceps tendinitis  Partial tear of labrum    PROCEDURE:  Left shoulder arthroscopy with debridement of labrum tear hand rotator cuff tear  Open acromioplasty coracoacromial ligament release  Biceps tenodesis  Rotator cuff repair    IMPANTS USED:   Implant Name Type Inv. Item Serial No.  Lot No. LRB No. Used Action   WIRE SET PASSING GRAFT - [id number]  WIRE SET PASSING GRAFT  [employer] [id number] Left 1 Implanted and Explanted   KIT FIBERTAK-HYBRID KNEE - [id number]  KIT FIBERTAK-HYBRID KNEE  [employer] [id number] Left 1 Implanted   ANCHOR SUT FT BIOCRKSCR 5.5MM - [id number]  ANCHOR SUT FT BIOCRKSCR 5.5MM  [employer] [id number] Left 1 Implanted   ANCHOR BIO-PUSHLOCK 3.5X19.5MM - [id number]  ANCHOR BIO-PUSHLOCK 3.5X19.5MM  [employer] [id number] Left 1 Implanted   ANCHOR BIO-PUSHLOCK 3.5X19.5MM - [id number]  ANCHOR BIO-PUSHLOCK 3.5X19.5MM  [employer] [id number] Left 1 Implanted       SURGEON: Dr. Sharp     ASSISTANT:  Ramses    ANESTHESIA:  Interscalene block and general    ESTIMATED BLOOD LOSS:  10 cc    COMPLICATIONS:  None   Specimens:   Specimen (24h ago, onward)      None              Discharge Note    OUTCOME: Patient tolerated treatment/procedure well without complication and is now ready for discharge.    DISPOSITION: Home     FINAL DIAGNOSIS:  Rotator cuff tear    FOLLOWUP: In clinic 2 weeks    DISCHARGE INSTRUCTIONS:    Discharge Procedure Orders   Diet general     Call MD for:  temperature >100.4     Call MD for:  redness, tenderness, or signs of infection (pain, swelling, redness, odor or green/yellow discharge around incision site)     Remove dressing in 72 hours   Order Comments: Apply Op site         Ac Sharp III

## 2025-04-21 NOTE — OP NOTE
Department of Orthopedic Surgery    Operative Note      Surgery Date: 4/21/2025     PREOPERATIVE DIAGNOSIS:  Left shoulder rotator cuff tear    POSTOPERATIVE DIAGNOSIS:  Left shoulder rotator cuff tear complete  Biceps tendinitis  Partial tear of labrum    PROCEDURE:  Left shoulder arthroscopy with debridement of labrum tear hand rotator cuff tear  Open acromioplasty coracoacromial ligament release  Biceps tenodesis  Rotator cuff repair    IMPANTS USED:   Implant Name Type Inv. Item Serial No.  Lot No. LRB No. Used Action   WIRE SET PASSING GRAFT - [id number]  WIRE SET PASSING GRAFT  [employer] [id number] Left 1 Implanted and Explanted   KIT FIBERTAK-HYBRID KNEE - [id number]  KIT FIBERTAK-HYBRID KNEE  [employer] [id number] Left 1 Implanted   ANCHOR SUT FT BIOCRKSCR 5.5MM - [id number]  ANCHOR SUT FT BIOCRKSCR 5.5MM  [employer] [id number] Left 1 Implanted   ANCHOR BIO-PUSHLOCK 3.5X19.5MM - [id number]  ANCHOR BIO-PUSHLOCK 3.5X19.5MM  [employer] [id number] Left 1 Implanted   ANCHOR BIO-PUSHLOCK 3.5X19.5MM - [id number]  ANCHOR BIO-PUSHLOCK 3.5X19.5MM  [employer] [id number] Left 1 Implanted       SURGEON: Dr. Sharp     ASSISTANT:  Ramses    ANESTHESIA:  Interscalene block and general    ESTIMATED BLOOD LOSS:  10 cc    COMPLICATIONS:  None    INDICATION:   Rosa Maria Juan is a 49 y.o. patient has had left shoulder pain for an extended period of time MRI showed probable complete tear she has failed conservative therapy including physical therapy she desire proceed with the surgery    PROCEDURE: The patient was brought to the operating room.  General anesthesia and interscalene block were administered per Anesthesia.  The patient was positioned in a beach chair position.  All bony prominences were well padded.  The left arm and shoulder were prepped and draped in normal sterile fashion.  A spinal needle was introduced posteriorly and the shoulder was injected with Saline with good return.  The skin was cut.  Trocar for the camera was  introduced.  A second portal was made anteriorly using a spinal needle going lateral to the coracoid above the subscapularis.  Disposable trocar was then introduced.  The shoulder was scoped through all compartments.  Patient was found to have a large tear of the rotator cuff some partial tearing of her labrum that was shaved the cuff was shaved with the did appear to be a complete tear just lateral to the biceps exiting the shoulder the articular cartilage was in good condition on both the glenoid and the humeral head of the biceps insertion on the glenoid looked good intra-articular of the biceps looked good but we advanced the biceps into the shoulder it was markedly inflamed decision was made to proceed with a tenodesis the anterior trocar was switched out to a larger trocar using a switching stick and then a 90 degree Passer was used to pass the suture through the biceps this was clamped arthroscopic scissors were used to release the biceps.    Oblique incision was made with the tip of the acromion dissection was carried down level of the fascia the skin was undermined circumferentially there the deltoid was split laterally elevated off the anterior acromion in line with the anterior clavicle patient has a large anterior spur like a bird beak type spur that was resected using a saw level with the anterior clavicle osteotome was used to remove the undersurface.  This was then rasped smooth.  A large thickened bursa was then removed it then it became obvious where the tear was not the cuff and then there was a large area of the inflammation around the.  The with the arm at 90° of forward flexion bicipital groove was opened up trying to go low biceps advanced out drilled the outer cortices with a K-wire in his used a hybrid FiberTak placed in use the loop on this to attach the biceps down into the groove and then re Neosporin that with a suture.  That is very stable wound this was irrigated out attention was then  turned to the cuff the tear was opened up further placed a bio corkscrew a tap then the bio corkscrew at the apex of the this and then used both sets of sutures repair the cuff back down and then use 2 push locks in the lateral cortices crossing of the sutures double row repair appeared to be very stable repair.  The cuff moved as a unit through range motion     Two bony tunnels were made in the acromion repairing the deltoid back to the acromion and to itself with 2. Tycron.  2-0 Vicryl and 4-0 Prolene were used on the skin.  A sterile dressing and sling were applied.  The patient tolerated the procedure well without complications.                  Ac Sharp III, MD

## 2025-04-21 NOTE — PT/OT/SLP EVAL
Physical Therapy Evaluation    Patient Name:  Rosa Maria Juan   MRN:  [id number]    Recommendations:     Discharge Recommendations: No Therapy Indicated   Discharge Equipment Recommendations: none   Barriers to discharge: None    Assessment:     Rosa Maria Juan is a 49 y.o. female admitted with a medical diagnosis of Rotator cuff tear.  She presents with the following impairments/functional limitations:   Patient with good understanding of post op education.    Rehab Prognosis: Good; patient would benefit from acute skilled PT services to address these deficits and reach maximum level of function.    Recent Surgery: Procedure(s) (LRB):  ARTHROSCOPY, SHOULDER, W/ DEBRIDEMENT, ROTATOR CUFF,BICEPS (Left)  REPAIR, ROTATOR CUFF (Left)  TENODESIS, BICEPS, OPEN (Left)  ACROMIOPLASTY (Left) * Day of Surgery *    Plan:     During this hospitalization, patient to be seen 1 x/week to address the identified rehab impairments via gait training, therapeutic activities and progress toward the following goals:    Plan of Care Expires:  04/21/25    Subjective     Chief Complaint: post op block  Patient/Family Comments/goals: plan is dc home today  Pain/Comfort:  Pain Rating 1: 0/10    Patients cultural, spiritual, [employer]al conflicts given the current situation: no    Living Environment:  Lives with spouse  Prior to admission, patients level of function was independent.  Equipment used at home: none.  DME owned (not currently used): none.  Upon discharge, patient will have assistance from family.    Objective:     Communicated with nurse prior to session.  Patient found supine with    upon PT entry to room.    General Precautions: Standard, fall  Orthopedic Precautions:    Braces: Shoulder abduction brace  Respiratory Status: Room air    Exams:  RLE ROM: WNL  RLE Strength: WNL  LLE ROM: WNL  LLE Strength: WNL    Functional Mobility:  Bed Mobility:     Supine to Sit: contact guard assistance  Sit to Supine: contact guard  assistance  Transfers:     Sit to Stand:  contact guard assistance with no AD  Gait: ambulated independent      AM-PAC 6 CLICK MOBILITY  Total Score:24       Treatment & Education:  HEP: prom and bicep protocol  Don/doff abd pillow    Patient left supine with call button in reach.    GOALS:   Multidisciplinary Problems       Physical Therapy Goals       Not on file                    DME Justifications:  No DME recommended requiring DME justifications    History:     Past Medical History:   Diagnosis Date    Hypertension     Postoperative nausea        Past Surgical History:   Procedure Laterality Date    ACROMIOPLASTY Right 02/01/2023    Procedure: ACROMIOPLASTY;  Surgeon: Ac Sharp III, MD;  Location: [facility] OR;  Service: Orthopedics;  Laterality: Right;    APPENDECTOMY  1994    KNEE ARTHROSCOPY W/ ACL RECONSTRUCTION Right 2013    ILM    KNEE ARTHROSCOPY W/ ACL RECONSTRUCTION AND PATELLA GRAFT Left 2012    ILM    KNEE ARTHROSCOPY W/ ACL RECONSTRUCTION AND PATELLA GRAFT Left 2015    ILM- ALLOGRAFT     REPAIR OF LABRUM OF HIP Right 02/01/2023    Procedure: REPAIR, ANTERIOR LABRUM,SHOULDER;  Surgeon: Ac Sharp III, MD;  Location: [facility] OR;  Service: Orthopedics;  Laterality: Right;    ROTATOR CUFF REPAIR Right 02/01/2023    Procedure: REPAIR, ROTATOR CUFF;  Surgeon: Ac Sharp III, MD;  Location: [facility] OR;  Service: Orthopedics;  Laterality: Right;    SHOULDER ARTHROSCOPY Right 02/01/2023    Procedure: ARTHROSCOPY, SHOULDER;  Surgeon: Ac Sharp III, MD;  Location: [facility] OR;  Service: Orthopedics;  Laterality: Right;       Time Tracking:     PT Received On: 04/21/25  PT Start Time: 1400     PT Stop Time: 1415  PT Total Time (min): 15 min     Billable Minutes: Evaluation 15      04/21/2025

## 2025-04-22 ENCOUNTER — TELEPHONE (OUTPATIENT)
Dept: ORTHOPEDICS | Facility: CLINIC | Age: 50
End: 2025-04-22
Payer: OTHER GOVERNMENT

## 2025-04-22 ENCOUNTER — RESULTS FOLLOW-UP (OUTPATIENT)
Dept: OBSTETRICS AND GYNECOLOGY | Facility: CLINIC | Age: 50
End: 2025-04-22

## 2025-04-22 DIAGNOSIS — M75.102 TEAR OF LEFT ROTATOR CUFF, UNSPECIFIED TEAR EXTENT, UNSPECIFIED WHETHER TRAUMATIC: Primary | ICD-10-CM

## 2025-04-22 NOTE — TELEPHONE ENCOUNTER
----- Message from Skylar sent at 4/22/2025  2:32 PM CDT -----  Who Called: Rosa Maria Muhammad Left Message for Patient: nurse staff Does the patient know what this is regarding?: physical therapy Preferred Method of Contact: Phone CallPatient's Preferred Phone Number on File: [phone] Additional Information: pt is calling to a referral for physical therapy for . Please contact pt for more info

## 2025-04-23 NOTE — ANESTHESIA POSTPROCEDURE EVALUATION
Anesthesia Post Evaluation    Patient: Rosa Maria Juan    Procedure(s) Performed: Procedure(s) (LRB):  ARTHROSCOPY, SHOULDER, W/ DEBRIDEMENT, ROTATOR CUFF,BICEPS (Left)  REPAIR, ROTATOR CUFF (Left)  TENODESIS, BICEPS, OPEN (Left)  ACROMIOPLASTY (Left)    Final Anesthesia Type: general      Patient location during evaluation: PACU  Patient participation: Yes- Able to Participate  Level of consciousness: awake and alert  Post-procedure vital signs: reviewed and stable  Pain management: adequate  Airway patency: patent  CAESAR mitigation strategies: Multimodal analgesia and Use of major conduction anesthesia (spinal/epidural) or peripheral nerve block  PONV status at discharge: No PONV  Anesthetic complications: no      Cardiovascular status: blood pressure returned to baseline  Respiratory status: unassisted  Hydration status: euvolemic  Follow-up not needed.              Vitals Value Taken Time   /80 04/21/25 14:31   Temp 37 °C (98.6 °F) 04/21/25 12:56   Pulse 58 04/21/25 14:31   Resp 18 04/21/25 14:25   SpO2 97 % 04/21/25 14:30   Vitals shown include unfiled device data.      Event Time   Out of Recovery 13:36:00         Pain/Raza Score: No data recorded

## 2025-04-25 ENCOUNTER — TELEPHONE (OUTPATIENT)
Dept: ORTHOPEDICS | Facility: CLINIC | Age: 50
End: 2025-04-25
Payer: OTHER GOVERNMENT

## 2025-04-25 NOTE — PROGRESS NOTES
"Return Gyn Office Visit    Assessment/Plan  Problem List Items Addressed This Visit          Renal/    Perimenopausal - Primary    Vaginal dryness    Menorrhagia with irregular cycle    Relevant Medications    medroxyPROGESTERone (PROVERA) 10 MG tablet     Other Visit Diagnoses       Irregular menses            Continue Estradiol vaginal cream as directed.   Discussed perimenopause and what to expect going forward. Discussed menopause is when she has no menses x 1 year. Discussed normal irregularity prior to this.   Discussed other symptoms of menopause and option of HRT if needed.  Recommended continued exercise and weight bearing exercise.  Calcium 500 mg daily and Vitamin D 1000 IU daily recommended.  If hip pain continues or worsens, then see primary care for further evaluation.     For irregular cycles, recommended cyclic progesterone q 3 months if no menses to help make menses lighter until menopause. Then, take TXA prn if heavy menses.    RTC in 1 year for annual exam and/or prn.      CC:   Chief Complaint   Patient presents with    Follow-up     US discuss     HPI:  48 y.o. who presents to office for follow up on irregular and heavy menses, vaginal dryness, and perimenopause.  She has not had another menses since she was here last month. She does have TXA to take for this prn.   She states the vaginal dryness is much improved with the Estradiol cream.   Discussed her labs showed a low estradiol and elevated FSH. Discussed that she is likely in perimenopause.     TVUS today was wnl: Uterus 7.2 x 4.0 x 3.2 cm. Endo 8.9 mm. Bilateral adnexa wnl. Reassurance provided.    Reviewed her PAP was ASCUS HPV negative. Recommended repeat PAP in 1 year.    Review of Systems - The following ROS was otherwise negative, except as noted in the HPI:  constitutional, respiratory, cardiovascular, gastrointestinal, genitourinary    Objective:  /78   Pulse 83   Resp 18   Ht 5' 7" (1.702 m)   Wt 83.9 kg (185 lb)   " SpO2 97%   BMI 28.98 kg/m²   General: Alert, well appearing, no acute distress  Psych: No depression or anxiety.     yes

## 2025-04-25 NOTE — TELEPHONE ENCOUNTER
----- Message from Lala sent at 4/25/2025 12:03 PM CDT -----  Regarding: ES  Patient called requested a call back to discuss PT referral.

## 2025-05-06 ENCOUNTER — OFFICE VISIT (OUTPATIENT)
Dept: ORTHOPEDICS | Facility: CLINIC | Age: 50
End: 2025-05-06
Payer: OTHER GOVERNMENT

## 2025-05-06 VITALS
DIASTOLIC BLOOD PRESSURE: 87 MMHG | HEART RATE: 79 BPM | HEIGHT: 67 IN | BODY MASS INDEX: 27 KG/M2 | SYSTOLIC BLOOD PRESSURE: 146 MMHG | OXYGEN SATURATION: 95 % | WEIGHT: 172 LBS

## 2025-05-06 DIAGNOSIS — Z09 FOLLOW-UP EXAMINATION, FOLLOWING OTHER SURGERY: Primary | ICD-10-CM

## 2025-05-06 PROCEDURE — 99214 OFFICE O/P EST MOD 30 MIN: CPT | Mod: PBBFAC | Performed by: ORTHOPAEDIC SURGERY

## 2025-05-06 PROCEDURE — 99024 POSTOP FOLLOW-UP VISIT: CPT | Mod: ,,, | Performed by: ORTHOPAEDIC SURGERY

## 2025-05-06 PROCEDURE — 99999 PR PBB SHADOW E&M-EST. PATIENT-LVL IV: CPT | Mod: PBBFAC,,, | Performed by: ORTHOPAEDIC SURGERY

## 2025-05-06 NOTE — PROGRESS NOTES
CC:    Chief Complaint   Patient presents with    Left Shoulder - Pain     LT SHOULDER SCOPE OPEN ACROMIO, RCR 4/21 (2WKS)           Previos History :  Right shoulder rotator cuff repair 1 bio corkscrew 2 push locks 02/01/2023  Right knee ACL 2013  Left knee ACL reconstruction 06/20/2012.  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   Left shoulder rotator cuff repair 1 bio corkscrew 2 push locks and biceps tenodesis 04/21/2025      History:  5/6/2025   Rosa Maria Juan is a 49 y.o.  status post 2 weeks postop left shoulder rotator cuff repair with biceps tenodesis  patient is doing [facility] patient states the shoulder has been a lot easier tthan the opposite shoulder      PE:   Left shoulder incision looks good stitches removed she is neurovascularly intact passive motion she is going to about 90° forward flexion 80° abduction      Radiology:  None        Ass/Plan:  Encouraged shoulder shrugs continue passive range motion at [facility] we will check on her in about 4 weeks        Ac Sharp III, MD    Subject to voice recognition errors,  transcription services are not allowed

## 2025-06-03 ENCOUNTER — OFFICE VISIT (OUTPATIENT)
Dept: ORTHOPEDICS | Facility: CLINIC | Age: 50
End: 2025-06-03
Payer: OTHER GOVERNMENT

## 2025-06-03 VITALS
SYSTOLIC BLOOD PRESSURE: 136 MMHG | HEIGHT: 67 IN | HEART RATE: 76 BPM | OXYGEN SATURATION: 98 % | BODY MASS INDEX: 27.49 KG/M2 | DIASTOLIC BLOOD PRESSURE: 84 MMHG | WEIGHT: 175.19 LBS

## 2025-06-03 DIAGNOSIS — Z98.890 S/P LEFT ROTATOR CUFF REPAIR: Primary | ICD-10-CM

## 2025-06-03 PROCEDURE — 99999 PR PBB SHADOW E&M-EST. PATIENT-LVL III: CPT | Mod: PBBFAC,,, | Performed by: ORTHOPAEDIC SURGERY

## 2025-06-03 PROCEDURE — 99213 OFFICE O/P EST LOW 20 MIN: CPT | Mod: PBBFAC | Performed by: ORTHOPAEDIC SURGERY

## 2025-06-03 RX ORDER — CELECOXIB 100 MG/1
100 CAPSULE ORAL 2 TIMES DAILY
Qty: 60 CAPSULE | Refills: 1 | Status: SHIPPED | OUTPATIENT
Start: 2025-06-03

## 2025-09-05 ENCOUNTER — OFFICE VISIT (OUTPATIENT)
Dept: ORTHOPEDICS | Facility: CLINIC | Age: 50
End: 2025-09-05
Payer: OTHER GOVERNMENT

## 2025-09-05 VITALS — HEIGHT: 67 IN | BODY MASS INDEX: 27.51 KG/M2 | WEIGHT: 175.25 LBS

## 2025-09-05 DIAGNOSIS — Z09 FOLLOW-UP EXAMINATION, FOLLOWING OTHER SURGERY: Primary | ICD-10-CM

## 2025-09-05 PROCEDURE — 99999 PR PBB SHADOW E&M-EST. PATIENT-LVL III: CPT | Mod: PBBFAC,,, | Performed by: ORTHOPAEDIC SURGERY

## 2025-09-05 PROCEDURE — 99213 OFFICE O/P EST LOW 20 MIN: CPT | Mod: PBBFAC | Performed by: ORTHOPAEDIC SURGERY

## 2025-09-05 PROCEDURE — 99024 POSTOP FOLLOW-UP VISIT: CPT | Mod: ,,, | Performed by: ORTHOPAEDIC SURGERY

## (undated) DEVICE — GLOVE 7.5 PROTEXIS PI BLUE

## (undated) DEVICE — SOL NACL IRR 1000ML BTL

## (undated) DEVICE — SUT #2 TI-CRON HGS-21 30IN

## (undated) DEVICE — TUBE SUC UNIV W CONN .25X12

## (undated) DEVICE — SLING ARM ULTRA III PAD MED

## (undated) DEVICE — SET,FOR GRAFT PASS WIRE
Type: IMPLANTABLE DEVICE | Site: SHOULDER | Status: NON-FUNCTIONAL
Brand: ARTHREX®
Removed: 2025-04-21

## (undated) DEVICE — SOL NACL IRR 3000ML

## (undated) DEVICE — APPLICATOR CHLORAPREP ORN 26ML

## (undated) DEVICE — CANISTER SUCTION MEDI-VAC 12L

## (undated) DEVICE — TUBING CROSSFLOW INFLOW CASS

## (undated) DEVICE — GOWN IMPERVIOUS BLUE XXL

## (undated) DEVICE — STRIP MEDI WND CLSR 1/2X4IN

## (undated) DEVICE — CANNULA ARTHRO 8.25MM X 7CM

## (undated) DEVICE — SUT PROLENE 4-0 FS-2 BL MON

## (undated) DEVICE — Device

## (undated) DEVICE — BLADE SHAVER ARTHSCP CUT 3.5MM

## (undated) DEVICE — SUT LASSO 90 DEGREE

## (undated) DEVICE — PENCIL ZIP PEN SMOKE EVAC 10FT

## (undated) DEVICE — GLOVE SENSICARE PI GRN 7.5

## (undated) DEVICE — ADHESIVE MASTISOL VIAL 48/BX

## (undated) DEVICE — POUCH INSTRUMENT 2 POCKET

## (undated) DEVICE — GLOVE BIOGEL SKINSENSE PI 7.0

## (undated) DEVICE — SUT BLU BR 2 TAPERD NDL 1/2

## (undated) DEVICE — GLOVE SENSICARE PI SURG 7.5

## (undated) DEVICE — GLOVE SENSICARE PI SURG 7

## (undated) DEVICE — GLOVE SENSICARE PI GRN 6.5

## (undated) DEVICE — PUSHLOCK KIT 2.9 MM DISP.

## (undated) DEVICE — SOLIDIFIER BTL W/TREAT 1500CC

## (undated) DEVICE — SUT LASSO SD 25DEG CRV RIGHT

## (undated) DEVICE — WRAP SHLDR ACT 2 CLD PK L XL

## (undated) DEVICE — GLOVE BIOGEL SKINSENSE PI 6.5

## (undated) DEVICE — SKIN STAPLER PMR35

## (undated) DEVICE — SUT LABRALTAPE 1.5X36 WHITE

## (undated) DEVICE — CLOSURE SKIN STERI STRIP 1/2X4

## (undated) DEVICE — BLADE SAG MIC FINE 9.5X25.5MM

## (undated) DEVICE — CANNULA OBT CONICAL TIP 4.5MM

## (undated) DEVICE — SUT 2-0 VICRYL / CT-1

## (undated) DEVICE — GLOVE BIOGEL SKINSENSE PI 7.5

## (undated) DEVICE — GLOVE SENSICARE PI SURG 8

## (undated) DEVICE — GOWN POLY REINF BRTH SLV XL

## (undated) DEVICE — GLOVE 7.0 PROTEXIS PI BLUE

## (undated) DEVICE — GLOVE 8 PROTEXIS PI BLUE

## (undated) DEVICE — KIT INST DISP KNEE FIBERTAK

## (undated) DEVICE — GLOVE BIOGEL SKINSENSE PI 8.0

## (undated) DEVICE — STAPLER SKIN PROXIMATE WIDE

## (undated) DEVICE — GLOVE 6.5 PROTEXIS PI BLUE

## (undated) DEVICE — PASSER SUTURE LOOP TACK SWIFT